# Patient Record
Sex: FEMALE | Race: WHITE | NOT HISPANIC OR LATINO
[De-identification: names, ages, dates, MRNs, and addresses within clinical notes are randomized per-mention and may not be internally consistent; named-entity substitution may affect disease eponyms.]

---

## 2017-01-10 ENCOUNTER — APPOINTMENT (OUTPATIENT)
Dept: FAMILY MEDICINE | Facility: CLINIC | Age: 42
End: 2017-01-10

## 2017-01-10 VITALS
HEART RATE: 80 BPM | SYSTOLIC BLOOD PRESSURE: 124 MMHG | BODY MASS INDEX: 38.99 KG/M2 | DIASTOLIC BLOOD PRESSURE: 70 MMHG | HEIGHT: 65 IN | WEIGHT: 234 LBS | TEMPERATURE: 97.9 F | RESPIRATION RATE: 14 BRPM

## 2017-02-04 ENCOUNTER — LABORATORY RESULT (OUTPATIENT)
Age: 42
End: 2017-02-04

## 2017-02-15 ENCOUNTER — OTHER (OUTPATIENT)
Age: 42
End: 2017-02-15

## 2017-05-16 ENCOUNTER — APPOINTMENT (OUTPATIENT)
Dept: FAMILY MEDICINE | Facility: CLINIC | Age: 42
End: 2017-05-16

## 2017-05-25 ENCOUNTER — APPOINTMENT (OUTPATIENT)
Dept: FAMILY MEDICINE | Facility: CLINIC | Age: 42
End: 2017-05-25

## 2017-05-25 VITALS
OXYGEN SATURATION: 98 % | HEIGHT: 65 IN | WEIGHT: 234 LBS | TEMPERATURE: 98.4 F | BODY MASS INDEX: 38.99 KG/M2 | RESPIRATION RATE: 15 BRPM | SYSTOLIC BLOOD PRESSURE: 120 MMHG | HEART RATE: 78 BPM | DIASTOLIC BLOOD PRESSURE: 78 MMHG

## 2017-05-25 RX ORDER — FLUTICASONE PROPIONATE 50 UG/1
50 SPRAY, METERED NASAL DAILY
Qty: 1 | Refills: 1 | Status: DISCONTINUED | COMMUNITY
Start: 2017-01-10 | End: 2017-05-25

## 2017-05-25 RX ORDER — AZITHROMYCIN 250 MG/1
250 TABLET, FILM COATED ORAL
Qty: 1 | Refills: 0 | Status: DISCONTINUED | COMMUNITY
Start: 2017-01-10 | End: 2017-05-25

## 2017-07-20 ENCOUNTER — LABORATORY RESULT (OUTPATIENT)
Age: 42
End: 2017-07-20

## 2017-07-20 ENCOUNTER — APPOINTMENT (OUTPATIENT)
Dept: ENDOCRINOLOGY | Facility: CLINIC | Age: 42
End: 2017-07-20

## 2017-07-20 VITALS
SYSTOLIC BLOOD PRESSURE: 120 MMHG | BODY MASS INDEX: 38.49 KG/M2 | DIASTOLIC BLOOD PRESSURE: 66 MMHG | HEIGHT: 65 IN | WEIGHT: 231 LBS | OXYGEN SATURATION: 99 % | HEART RATE: 89 BPM

## 2017-07-26 LAB
T3RU NFR SERPL: 0.99 INDEX
T4 SERPL-MCNC: 9.3 UG/DL
THYROGLOB AB SERPL-ACNC: <20 IU/ML
THYROGLOB SERPL-MCNC: <0.2 NG/ML
TSH SERPL-ACNC: 0.61 UIU/ML

## 2017-08-23 ENCOUNTER — OUTPATIENT (OUTPATIENT)
Dept: OUTPATIENT SERVICES | Facility: HOSPITAL | Age: 42
LOS: 1 days | End: 2017-08-23
Payer: COMMERCIAL

## 2017-08-23 ENCOUNTER — APPOINTMENT (OUTPATIENT)
Dept: MAMMOGRAPHY | Facility: IMAGING CENTER | Age: 42
End: 2017-08-23
Payer: COMMERCIAL

## 2017-08-23 DIAGNOSIS — Z12.31 ENCOUNTER FOR SCREENING MAMMOGRAM FOR MALIGNANT NEOPLASM OF BREAST: ICD-10-CM

## 2017-08-23 PROCEDURE — 77067 SCR MAMMO BI INCL CAD: CPT

## 2017-08-23 PROCEDURE — G0202: CPT | Mod: 26

## 2017-08-23 PROCEDURE — 77063 BREAST TOMOSYNTHESIS BI: CPT | Mod: 26

## 2017-08-23 PROCEDURE — 77063 BREAST TOMOSYNTHESIS BI: CPT

## 2017-10-10 ENCOUNTER — APPOINTMENT (OUTPATIENT)
Dept: FAMILY MEDICINE | Facility: CLINIC | Age: 42
End: 2017-10-10
Payer: COMMERCIAL

## 2017-10-10 VITALS
HEIGHT: 64 IN | HEART RATE: 103 BPM | SYSTOLIC BLOOD PRESSURE: 111 MMHG | WEIGHT: 225 LBS | BODY MASS INDEX: 38.41 KG/M2 | DIASTOLIC BLOOD PRESSURE: 80 MMHG

## 2017-10-10 DIAGNOSIS — E22.1 HYPERPROLACTINEMIA: ICD-10-CM

## 2017-10-10 PROCEDURE — 99214 OFFICE O/P EST MOD 30 MIN: CPT

## 2017-11-01 ENCOUNTER — RX RENEWAL (OUTPATIENT)
Age: 42
End: 2017-11-01

## 2017-12-27 ENCOUNTER — APPOINTMENT (OUTPATIENT)
Dept: CARDIOLOGY | Facility: CLINIC | Age: 42
End: 2017-12-27

## 2018-01-30 ENCOUNTER — APPOINTMENT (OUTPATIENT)
Dept: OBGYN | Facility: CLINIC | Age: 43
End: 2018-01-30
Payer: COMMERCIAL

## 2018-01-30 VITALS
DIASTOLIC BLOOD PRESSURE: 87 MMHG | BODY MASS INDEX: 38.65 KG/M2 | HEIGHT: 64 IN | SYSTOLIC BLOOD PRESSURE: 135 MMHG | HEART RATE: 80 BPM | WEIGHT: 226.38 LBS

## 2018-01-30 PROCEDURE — 99396 PREV VISIT EST AGE 40-64: CPT

## 2018-01-30 RX ORDER — FLUTICASONE PROPIONATE 50 UG/1
50 SPRAY, METERED NASAL TWICE DAILY
Qty: 1 | Refills: 2 | Status: COMPLETED | COMMUNITY
Start: 2017-05-25 | End: 2018-01-30

## 2018-01-30 RX ORDER — AMOXICILLIN AND CLAVULANATE POTASSIUM 875; 125 MG/1; MG/1
875-125 TABLET, COATED ORAL TWICE DAILY
Qty: 20 | Refills: 0 | Status: COMPLETED | COMMUNITY
Start: 2017-05-25 | End: 2018-01-30

## 2018-01-30 RX ORDER — FLUCONAZOLE 150 MG/1
150 TABLET ORAL DAILY
Qty: 1 | Refills: 0 | Status: COMPLETED | COMMUNITY
Start: 2017-05-25 | End: 2018-01-30

## 2018-01-31 LAB — HPV HIGH+LOW RISK DNA PNL CVX: NOT DETECTED

## 2018-02-05 LAB — CYTOLOGY CVX/VAG DOC THIN PREP: NORMAL

## 2018-02-13 ENCOUNTER — TRANSCRIPTION ENCOUNTER (OUTPATIENT)
Age: 43
End: 2018-02-13

## 2018-02-14 ENCOUNTER — APPOINTMENT (OUTPATIENT)
Dept: FAMILY MEDICINE | Facility: CLINIC | Age: 43
End: 2018-02-14

## 2018-04-03 ENCOUNTER — APPOINTMENT (OUTPATIENT)
Dept: CARDIOLOGY | Facility: CLINIC | Age: 43
End: 2018-04-03
Payer: COMMERCIAL

## 2018-04-03 ENCOUNTER — NON-APPOINTMENT (OUTPATIENT)
Age: 43
End: 2018-04-03

## 2018-04-03 VITALS — HEART RATE: 80 BPM | DIASTOLIC BLOOD PRESSURE: 90 MMHG | SYSTOLIC BLOOD PRESSURE: 130 MMHG

## 2018-04-03 VITALS
HEIGHT: 64 IN | SYSTOLIC BLOOD PRESSURE: 123 MMHG | BODY MASS INDEX: 38.58 KG/M2 | HEART RATE: 60 BPM | DIASTOLIC BLOOD PRESSURE: 82 MMHG | WEIGHT: 226 LBS | RESPIRATION RATE: 17 BRPM | OXYGEN SATURATION: 100 %

## 2018-04-03 PROCEDURE — 99214 OFFICE O/P EST MOD 30 MIN: CPT

## 2018-04-03 PROCEDURE — 93000 ELECTROCARDIOGRAM COMPLETE: CPT

## 2018-04-03 PROCEDURE — 93228 REMOTE 30 DAY ECG REV/REPORT: CPT

## 2018-04-04 ENCOUNTER — APPOINTMENT (OUTPATIENT)
Dept: CARDIOLOGY | Facility: CLINIC | Age: 43
End: 2018-04-04
Payer: COMMERCIAL

## 2018-04-04 PROCEDURE — 93306 TTE W/DOPPLER COMPLETE: CPT

## 2018-04-25 ENCOUNTER — APPOINTMENT (OUTPATIENT)
Dept: CARDIOLOGY | Facility: CLINIC | Age: 43
End: 2018-04-25

## 2018-05-10 ENCOUNTER — CLINICAL ADVICE (OUTPATIENT)
Age: 43
End: 2018-05-10

## 2018-05-17 ENCOUNTER — APPOINTMENT (OUTPATIENT)
Dept: CARDIOLOGY | Facility: CLINIC | Age: 43
End: 2018-05-17
Payer: COMMERCIAL

## 2018-05-17 ENCOUNTER — NON-APPOINTMENT (OUTPATIENT)
Age: 43
End: 2018-05-17

## 2018-05-17 VITALS
SYSTOLIC BLOOD PRESSURE: 140 MMHG | OXYGEN SATURATION: 100 % | DIASTOLIC BLOOD PRESSURE: 88 MMHG | WEIGHT: 225 LBS | HEIGHT: 64 IN | BODY MASS INDEX: 38.41 KG/M2 | HEART RATE: 93 BPM

## 2018-05-17 PROCEDURE — 99214 OFFICE O/P EST MOD 30 MIN: CPT

## 2018-05-17 PROCEDURE — 93000 ELECTROCARDIOGRAM COMPLETE: CPT

## 2018-05-24 ENCOUNTER — APPOINTMENT (OUTPATIENT)
Dept: CARDIOLOGY | Facility: CLINIC | Age: 43
End: 2018-05-24
Payer: COMMERCIAL

## 2018-05-24 PROCEDURE — 93015 CV STRESS TEST SUPVJ I&R: CPT

## 2018-07-23 ENCOUNTER — APPOINTMENT (OUTPATIENT)
Dept: ENDOCRINOLOGY | Facility: CLINIC | Age: 43
End: 2018-07-23
Payer: COMMERCIAL

## 2018-07-23 ENCOUNTER — LABORATORY RESULT (OUTPATIENT)
Age: 43
End: 2018-07-23

## 2018-07-23 VITALS
HEART RATE: 78 BPM | DIASTOLIC BLOOD PRESSURE: 70 MMHG | BODY MASS INDEX: 39.27 KG/M2 | HEIGHT: 64 IN | SYSTOLIC BLOOD PRESSURE: 110 MMHG | WEIGHT: 230 LBS | OXYGEN SATURATION: 98 %

## 2018-07-23 PROCEDURE — 99214 OFFICE O/P EST MOD 30 MIN: CPT

## 2018-07-26 LAB
25(OH)D3 SERPL-MCNC: 28 NG/ML
T3RU NFR SERPL: 0.99 INDEX
T4 SERPL-MCNC: 8.5 UG/DL
THYROGLOB AB SERPL-ACNC: <20 IU/ML
THYROGLOB SERPL-MCNC: <0.2 NG/ML
TSH SERPL-ACNC: 0.5 UIU/ML

## 2018-08-25 ENCOUNTER — OUTPATIENT (OUTPATIENT)
Dept: OUTPATIENT SERVICES | Facility: HOSPITAL | Age: 43
LOS: 1 days | End: 2018-08-25
Payer: COMMERCIAL

## 2018-08-25 ENCOUNTER — APPOINTMENT (OUTPATIENT)
Dept: MAMMOGRAPHY | Facility: IMAGING CENTER | Age: 43
End: 2018-08-25
Payer: COMMERCIAL

## 2018-08-25 DIAGNOSIS — Z01.419 ENCOUNTER FOR GYNECOLOGICAL EXAMINATION (GENERAL) (ROUTINE) WITHOUT ABNORMAL FINDINGS: ICD-10-CM

## 2018-08-25 PROCEDURE — 77063 BREAST TOMOSYNTHESIS BI: CPT

## 2018-08-25 PROCEDURE — 77067 SCR MAMMO BI INCL CAD: CPT | Mod: 26

## 2018-08-25 PROCEDURE — 77067 SCR MAMMO BI INCL CAD: CPT

## 2018-08-25 PROCEDURE — 77063 BREAST TOMOSYNTHESIS BI: CPT | Mod: 26

## 2018-08-31 ENCOUNTER — EMERGENCY (EMERGENCY)
Facility: HOSPITAL | Age: 43
LOS: 1 days | Discharge: HOME | End: 2018-08-31
Attending: EMERGENCY MEDICINE | Admitting: EMERGENCY MEDICINE
Payer: COMMERCIAL

## 2018-08-31 VITALS
SYSTOLIC BLOOD PRESSURE: 132 MMHG | OXYGEN SATURATION: 98 % | WEIGHT: 220.02 LBS | RESPIRATION RATE: 14 BRPM | DIASTOLIC BLOOD PRESSURE: 80 MMHG | HEIGHT: 65 IN | TEMPERATURE: 99 F | HEART RATE: 80 BPM

## 2018-08-31 DIAGNOSIS — S09.90XA UNSPECIFIED INJURY OF HEAD, INITIAL ENCOUNTER: ICD-10-CM

## 2018-08-31 PROCEDURE — 81025 URINE PREGNANCY TEST: CPT

## 2018-08-31 PROCEDURE — 72220 X-RAY EXAM SACRUM TAILBONE: CPT | Mod: 26

## 2018-08-31 PROCEDURE — 99284 EMERGENCY DEPT VISIT MOD MDM: CPT

## 2018-08-31 PROCEDURE — 70450 CT HEAD/BRAIN W/O DYE: CPT | Mod: 26

## 2018-08-31 PROCEDURE — 72220 X-RAY EXAM SACRUM TAILBONE: CPT

## 2018-08-31 PROCEDURE — 99284 EMERGENCY DEPT VISIT MOD MDM: CPT | Mod: 25

## 2018-08-31 PROCEDURE — 70450 CT HEAD/BRAIN W/O DYE: CPT

## 2018-08-31 RX ORDER — ACETAMINOPHEN 500 MG
650 TABLET ORAL ONCE
Qty: 0 | Refills: 0 | Status: COMPLETED | OUTPATIENT
Start: 2018-08-31 | End: 2018-08-31

## 2018-08-31 RX ADMIN — Medication 650 MILLIGRAM(S): at 17:12

## 2018-08-31 NOTE — ED PROVIDER NOTE - ATTENDING CONTRIBUTION TO CARE
pt with mechanical fall at home striking lower back and head.  No LOC.  Pain in lower back and head    Gen:  Well appearning in NAD  Head:  NC/AT  Resp: No distress   Ext: no deformities  Skin: warm and dry as visualized     Pt with mechanical fall - will image both
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
## 2018-08-31 NOTE — ED ADULT NURSE NOTE - PMH
Allergic Asthma  Last attack was more than 5 years ago  GERD (Gastroesophageal Reflux Disease)  10 years ago  Heart Murmur    Malignant Neoplasm of Thyroid Gland    Seasonal Allergies    Thalassemia Trait    Thyroid Nodule

## 2018-08-31 NOTE — ED ADULT NURSE NOTE - NSIMPLEMENTINTERV_GEN_ALL_ED
Implemented All Fall Risk Interventions:  [place] to call system. Call bell, personal items and telephone within reach. Instruct patient to call for assistance. Room bathroom lighting operational. Non-slip footwear when patient is off stretcher. Physically safe environment: no spills, clutter or unnecessary equipment. Stretcher in lowest position, wheels locked, appropriate side rails in place. Provide visual cue, wrist band, yellow gown, etc. Monitor gait and stability. Monitor for mental status changes and reorient to person, place, and time. Review medications for side effects contributing to fall risk. Reinforce activity limits and safety measures with patient and family.

## 2018-08-31 NOTE — ED PROVIDER NOTE - CARE PLAN
Principal Discharge DX:	Injury of head, initial encounter Principal Discharge DX:	Injury of head, initial encounter  Secondary Diagnosis:	Acute low back pain without sciatica, unspecified back pain laterality

## 2018-09-06 ENCOUNTER — NON-APPOINTMENT (OUTPATIENT)
Age: 43
End: 2018-09-06

## 2018-09-06 ENCOUNTER — APPOINTMENT (OUTPATIENT)
Dept: CARDIOLOGY | Facility: CLINIC | Age: 43
End: 2018-09-06
Payer: COMMERCIAL

## 2018-09-06 VITALS
DIASTOLIC BLOOD PRESSURE: 86 MMHG | HEART RATE: 77 BPM | SYSTOLIC BLOOD PRESSURE: 128 MMHG | WEIGHT: 230 LBS | BODY MASS INDEX: 39.27 KG/M2 | OXYGEN SATURATION: 99 % | HEIGHT: 64 IN

## 2018-09-06 DIAGNOSIS — Z87.898 PERSONAL HISTORY OF OTHER SPECIFIED CONDITIONS: ICD-10-CM

## 2018-09-06 DIAGNOSIS — M54.9 DORSALGIA, UNSPECIFIED: ICD-10-CM

## 2018-09-06 DIAGNOSIS — Z86.19 PERSONAL HISTORY OF OTHER INFECTIOUS AND PARASITIC DISEASES: ICD-10-CM

## 2018-09-06 DIAGNOSIS — Z01.419 ENCOUNTER FOR GYNECOLOGICAL EXAMINATION (GENERAL) (ROUTINE) W/OUT ABNORMAL FINDINGS: ICD-10-CM

## 2018-09-06 DIAGNOSIS — Z87.09 PERSONAL HISTORY OF OTHER DISEASES OF THE RESPIRATORY SYSTEM: ICD-10-CM

## 2018-09-06 DIAGNOSIS — J06.9 ACUTE UPPER RESPIRATORY INFECTION, UNSPECIFIED: ICD-10-CM

## 2018-09-06 DIAGNOSIS — L72.12 TRICHODERMAL CYST: ICD-10-CM

## 2018-09-06 PROCEDURE — 99214 OFFICE O/P EST MOD 30 MIN: CPT

## 2018-09-06 PROCEDURE — 93000 ELECTROCARDIOGRAM COMPLETE: CPT

## 2018-11-02 ENCOUNTER — RX RENEWAL (OUTPATIENT)
Age: 43
End: 2018-11-02

## 2019-02-05 ENCOUNTER — APPOINTMENT (OUTPATIENT)
Dept: OBGYN | Facility: CLINIC | Age: 44
End: 2019-02-05
Payer: COMMERCIAL

## 2019-02-05 VITALS
HEIGHT: 64 IN | WEIGHT: 228 LBS | BODY MASS INDEX: 38.93 KG/M2 | DIASTOLIC BLOOD PRESSURE: 85 MMHG | HEART RATE: 65 BPM | SYSTOLIC BLOOD PRESSURE: 127 MMHG

## 2019-02-05 PROCEDURE — 99396 PREV VISIT EST AGE 40-64: CPT

## 2019-02-05 NOTE — PHYSICAL EXAM
[Awake] : awake [Alert] : alert [Acute Distress] : no acute distress [Mass] : no breast mass [Nipple Discharge] : no nipple discharge [Axillary LAD] : no axillary lymphadenopathy [Soft] : soft [Tender] : non tender [Oriented x3] : oriented to person, place, and time [Normal] : uterus [No Bleeding] : there was no active vaginal bleeding [Anteversion] : anteverted [Uterine Adnexae] : were not tender and not enlarged

## 2019-02-05 NOTE — CHIEF COMPLAINT
[Annual Visit] : annual visit [FreeTextEntry1] : 43 y.o. P 2003   LMP  1/27/19 for annual exam. pt is s/p Total thyroidectomy for thyroid ca. pt is on Synthroid, Gertrudis Vu is Endocrinologist. . pt is due for mammo in Aug. and pap in 2021  had vasectomy. children are Santosh and Laura.

## 2019-04-29 ENCOUNTER — RX RENEWAL (OUTPATIENT)
Age: 44
End: 2019-04-29

## 2019-07-29 ENCOUNTER — TRANSCRIPTION ENCOUNTER (OUTPATIENT)
Age: 44
End: 2019-07-29

## 2019-07-29 ENCOUNTER — LABORATORY RESULT (OUTPATIENT)
Age: 44
End: 2019-07-29

## 2019-07-29 ENCOUNTER — APPOINTMENT (OUTPATIENT)
Dept: ENDOCRINOLOGY | Facility: CLINIC | Age: 44
End: 2019-07-29
Payer: COMMERCIAL

## 2019-07-29 VITALS
BODY MASS INDEX: 38.76 KG/M2 | HEART RATE: 83 BPM | WEIGHT: 227 LBS | DIASTOLIC BLOOD PRESSURE: 80 MMHG | HEIGHT: 64 IN | OXYGEN SATURATION: 98 % | SYSTOLIC BLOOD PRESSURE: 120 MMHG

## 2019-07-29 PROCEDURE — 99214 OFFICE O/P EST MOD 30 MIN: CPT

## 2019-07-30 ENCOUNTER — APPOINTMENT (OUTPATIENT)
Dept: CARDIOLOGY | Facility: CLINIC | Age: 44
End: 2019-07-30
Payer: COMMERCIAL

## 2019-07-30 ENCOUNTER — NON-APPOINTMENT (OUTPATIENT)
Age: 44
End: 2019-07-30

## 2019-07-30 ENCOUNTER — CLINICAL ADVICE (OUTPATIENT)
Age: 44
End: 2019-07-30

## 2019-07-30 VITALS
DIASTOLIC BLOOD PRESSURE: 84 MMHG | WEIGHT: 230 LBS | BODY MASS INDEX: 39.27 KG/M2 | OXYGEN SATURATION: 97 % | SYSTOLIC BLOOD PRESSURE: 139 MMHG | HEIGHT: 64 IN | HEART RATE: 88 BPM

## 2019-07-30 LAB
25(OH)D3 SERPL-MCNC: 29.9 NG/ML
ESTIMATED AVERAGE GLUCOSE: 114 MG/DL
HBA1C MFR BLD HPLC: 5.6 %
T3RU NFR SERPL: 1 TBI
T4 SERPL-MCNC: 7.5 UG/DL
THYROGLOB AB SERPL-ACNC: <20 IU/ML
THYROGLOB SERPL-MCNC: <0.2 NG/ML
TSH SERPL-ACNC: 1.32 UIU/ML

## 2019-07-30 PROCEDURE — 93000 ELECTROCARDIOGRAM COMPLETE: CPT

## 2019-07-30 PROCEDURE — 99214 OFFICE O/P EST MOD 30 MIN: CPT

## 2019-07-30 NOTE — REVIEW OF SYSTEMS
[Headache] : headache [Chest Pain] : chest pain [Palpitations] : palpitations [see HPI] : see HPI [Numbness (Hypesthesia)] : numbness [Tingling (Paresthesia)] : tingling [Negative] : Heme/Lymph [Shortness Of Breath] : no shortness of breath

## 2019-07-30 NOTE — DISCUSSION/SUMMARY
[FreeTextEntry1] : The patient was examined. Her blood pressure was 139/84,and  her pulse was 88. Her lungs were clear to auscultation. Cardiac exam was  negative for murmurs rubs or gallops. Her EKG showed normal sinus rhythm and normal QRS complexes.No new medications were prescribed at this visit. She will return in approximately 12 months ,or earlier if needed. She'll continue her current medication. Will send her for an echocardiogram to assess her valvular heart disease. We'll set of routine blood tests, except the blood test with the endocrinologist sent..

## 2019-07-30 NOTE — PHYSICAL EXAM
[General Appearance - Well Developed] : well developed [Normal Appearance] : normal appearance [Well Groomed] : well groomed [No Deformities] : no deformities [General Appearance - In No Acute Distress] : no acute distress [Normal Conjunctiva] : the conjunctiva exhibited no abnormalities [No Oral Pallor] : no oral pallor [Normal Oral Mucosa] : normal oral mucosa [Eyelids - No Xanthelasma] : the eyelids demonstrated no xanthelasmas [No Oral Cyanosis] : no oral cyanosis [Normal Jugular Venous A Waves Present] : normal jugular venous A waves present [Normal Jugular Venous V Waves Present] : normal jugular venous V waves present [No Jugular Venous Castillo A Waves] : no jugular venous castillo A waves [Exaggerated Use Of Accessory Muscles For Inspiration] : no accessory muscle use [Auscultation Breath Sounds / Voice Sounds] : lungs were clear to auscultation bilaterally [Respiration, Rhythm And Depth] : normal respiratory rhythm and effort [Heart Rate And Rhythm] : heart rate and rhythm were normal [Heart Sounds] : normal S1 and S2 [Murmurs] : no murmurs present [Abdomen Soft] : soft [Abdomen Mass (___ Cm)] : no abdominal mass palpated [Abdomen Tenderness] : non-tender [Nail Clubbing] : no clubbing of the fingernails [Gait - Sufficient For Exercise Testing] : the gait was sufficient for exercise testing [Abnormal Walk] : normal gait [Cyanosis, Localized] : no localized cyanosis [Petechial Hemorrhages (___cm)] : no petechial hemorrhages [Skin Color & Pigmentation] : normal skin color and pigmentation [] : no rash [Skin Lesions] : no skin lesions [No Venous Stasis] : no venous stasis [No Skin Ulcers] : no skin ulcer [No Xanthoma] : no  xanthoma was observed [Oriented To Time, Place, And Person] : oriented to person, place, and time [Mood] : the mood was normal [Affect] : the affect was normal [No Anxiety] : not feeling anxious [FreeTextEntry1] : obese
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
## 2019-07-31 LAB
ALBUMIN SERPL ELPH-MCNC: 4.9 G/DL
ALP BLD-CCNC: 62 U/L
ALT SERPL-CCNC: 23 U/L
ANION GAP SERPL CALC-SCNC: 13 MMOL/L
AST SERPL-CCNC: 19 U/L
BASOPHILS # BLD AUTO: 0.11 K/UL
BASOPHILS NFR BLD AUTO: 1.1 %
BILIRUB SERPL-MCNC: 0.8 MG/DL
BUN SERPL-MCNC: 15 MG/DL
CALCIUM SERPL-MCNC: 9.6 MG/DL
CHLORIDE SERPL-SCNC: 106 MMOL/L
CHOLEST SERPL-MCNC: 167 MG/DL
CHOLEST/HDLC SERPL: 5.1 RATIO
CO2 SERPL-SCNC: 21 MMOL/L
CREAT SERPL-MCNC: 0.55 MG/DL
EOSINOPHIL # BLD AUTO: 0.31 K/UL
EOSINOPHIL NFR BLD AUTO: 3.1 %
GLUCOSE SERPL-MCNC: 84 MG/DL
HCT VFR BLD CALC: 40 %
HDLC SERPL-MCNC: 33 MG/DL
HGB BLD-MCNC: 11.3 G/DL
IMM GRANULOCYTES NFR BLD AUTO: 0.2 %
LDLC SERPL CALC-MCNC: 93 MG/DL
LYMPHOCYTES # BLD AUTO: 3.45 K/UL
LYMPHOCYTES NFR BLD AUTO: 34 %
MAN DIFF?: NORMAL
MCHC RBC-ENTMCNC: 19.9 PG
MCHC RBC-ENTMCNC: 28.3 GM/DL
MCV RBC AUTO: 70.5 FL
MONOCYTES # BLD AUTO: 0.67 K/UL
MONOCYTES NFR BLD AUTO: 6.6 %
NEUTROPHILS # BLD AUTO: 5.6 K/UL
NEUTROPHILS NFR BLD AUTO: 55 %
PLATELET # BLD AUTO: 363 K/UL
POTASSIUM SERPL-SCNC: 4.3 MMOL/L
PROT SERPL-MCNC: 7.5 G/DL
RBC # BLD: 5.67 M/UL
RBC # FLD: 18.9 %
SODIUM SERPL-SCNC: 140 MMOL/L
TRIGL SERPL-MCNC: 207 MG/DL
WBC # FLD AUTO: 10.16 K/UL

## 2019-08-20 ENCOUNTER — APPOINTMENT (OUTPATIENT)
Dept: CARDIOLOGY | Facility: CLINIC | Age: 44
End: 2019-08-20
Payer: COMMERCIAL

## 2019-08-20 PROCEDURE — 93306 TTE W/DOPPLER COMPLETE: CPT

## 2019-09-30 ENCOUNTER — FORM ENCOUNTER (OUTPATIENT)
Age: 44
End: 2019-09-30

## 2019-10-01 ENCOUNTER — APPOINTMENT (OUTPATIENT)
Dept: ULTRASOUND IMAGING | Facility: IMAGING CENTER | Age: 44
End: 2019-10-01
Payer: COMMERCIAL

## 2019-10-01 ENCOUNTER — APPOINTMENT (OUTPATIENT)
Dept: MAMMOGRAPHY | Facility: IMAGING CENTER | Age: 44
End: 2019-10-01
Payer: COMMERCIAL

## 2019-10-01 ENCOUNTER — OUTPATIENT (OUTPATIENT)
Dept: OUTPATIENT SERVICES | Facility: HOSPITAL | Age: 44
LOS: 1 days | End: 2019-10-01
Payer: COMMERCIAL

## 2019-10-01 DIAGNOSIS — Z01.419 ENCOUNTER FOR GYNECOLOGICAL EXAMINATION (GENERAL) (ROUTINE) WITHOUT ABNORMAL FINDINGS: ICD-10-CM

## 2019-10-01 PROCEDURE — 77067 SCR MAMMO BI INCL CAD: CPT

## 2019-10-01 PROCEDURE — 77063 BREAST TOMOSYNTHESIS BI: CPT | Mod: 26

## 2019-10-01 PROCEDURE — 77067 SCR MAMMO BI INCL CAD: CPT | Mod: 26

## 2019-10-01 PROCEDURE — 77063 BREAST TOMOSYNTHESIS BI: CPT

## 2019-10-01 PROCEDURE — 76641 ULTRASOUND BREAST COMPLETE: CPT | Mod: 26,50

## 2019-10-01 PROCEDURE — 76641 ULTRASOUND BREAST COMPLETE: CPT

## 2019-11-18 ENCOUNTER — APPOINTMENT (OUTPATIENT)
Dept: CARDIOLOGY | Facility: CLINIC | Age: 44
End: 2019-11-18

## 2019-11-27 ENCOUNTER — APPOINTMENT (OUTPATIENT)
Dept: CARDIOLOGY | Facility: CLINIC | Age: 44
End: 2019-11-27
Payer: COMMERCIAL

## 2019-11-27 ENCOUNTER — NON-APPOINTMENT (OUTPATIENT)
Age: 44
End: 2019-11-27

## 2019-11-27 VITALS
TEMPERATURE: 97.9 F | HEART RATE: 81 BPM | DIASTOLIC BLOOD PRESSURE: 87 MMHG | HEIGHT: 64 IN | RESPIRATION RATE: 17 BRPM | WEIGHT: 227 LBS | SYSTOLIC BLOOD PRESSURE: 133 MMHG | BODY MASS INDEX: 38.76 KG/M2 | OXYGEN SATURATION: 100 %

## 2019-11-27 DIAGNOSIS — R70.0 ELEVATED ERYTHROCYTE SEDIMENTATION RATE: ICD-10-CM

## 2019-11-27 DIAGNOSIS — H65.90 UNSPECIFIED NONSUPPURATIVE OTITIS MEDIA, UNSPECIFIED EAR: ICD-10-CM

## 2019-11-27 DIAGNOSIS — Z01.419 ENCOUNTER FOR GYNECOLOGICAL EXAMINATION (GENERAL) (ROUTINE) W/OUT ABNORMAL FINDINGS: ICD-10-CM

## 2019-11-27 DIAGNOSIS — R74.8 ABNORMAL LEVELS OF OTHER SERUM ENZYMES: ICD-10-CM

## 2019-11-27 DIAGNOSIS — R20.0 ANESTHESIA OF SKIN: ICD-10-CM

## 2019-11-27 DIAGNOSIS — E73.9 LACTOSE INTOLERANCE, UNSPECIFIED: ICD-10-CM

## 2019-11-27 DIAGNOSIS — R20.2 ANESTHESIA OF SKIN: ICD-10-CM

## 2019-11-27 DIAGNOSIS — R41.840 ATTENTION AND CONCENTRATION DEFICIT: ICD-10-CM

## 2019-11-27 PROCEDURE — 99214 OFFICE O/P EST MOD 30 MIN: CPT

## 2019-11-27 PROCEDURE — 93000 ELECTROCARDIOGRAM COMPLETE: CPT
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
## 2019-11-27 NOTE — DISCUSSION/SUMMARY
[FreeTextEntry1] : The patient was examined. Her blood pressure was 133/87,and  her pulse was 81. Her lungs were clear to auscultation. Cardiac exam was  negative for murmurs rubs or gallops. Her EKG showed normal sinus rhythm and nonspecific ST and T-wave changes. No acute changes are seen. .No new medications were prescribed at this visit. She will return in approximately 4  months ,or earlier if needed. She'll continue her current medication.

## 2019-11-27 NOTE — PHYSICAL EXAM
[General Appearance - Well Developed] : well developed [Normal Appearance] : normal appearance [Well Groomed] : well groomed [No Deformities] : no deformities [General Appearance - In No Acute Distress] : no acute distress [Normal Conjunctiva] : the conjunctiva exhibited no abnormalities [Eyelids - No Xanthelasma] : the eyelids demonstrated no xanthelasmas [Normal Oral Mucosa] : normal oral mucosa [No Oral Pallor] : no oral pallor [No Oral Cyanosis] : no oral cyanosis [Normal Jugular Venous A Waves Present] : normal jugular venous A waves present [Normal Jugular Venous V Waves Present] : normal jugular venous V waves present [No Jugular Venous Castillo A Waves] : no jugular venous castillo A waves [Respiration, Rhythm And Depth] : normal respiratory rhythm and effort [Exaggerated Use Of Accessory Muscles For Inspiration] : no accessory muscle use [Auscultation Breath Sounds / Voice Sounds] : lungs were clear to auscultation bilaterally [Heart Rate And Rhythm] : heart rate and rhythm were normal [Heart Sounds] : normal S1 and S2 [Murmurs] : no murmurs present [Abdomen Soft] : soft [Abdomen Tenderness] : non-tender [Abdomen Mass (___ Cm)] : no abdominal mass palpated [Abnormal Walk] : normal gait [Gait - Sufficient For Exercise Testing] : the gait was sufficient for exercise testing [Nail Clubbing] : no clubbing of the fingernails [Cyanosis, Localized] : no localized cyanosis [Petechial Hemorrhages (___cm)] : no petechial hemorrhages [Skin Color & Pigmentation] : normal skin color and pigmentation [] : no rash [No Venous Stasis] : no venous stasis [Skin Lesions] : no skin lesions [No Skin Ulcers] : no skin ulcer [No Xanthoma] : no  xanthoma was observed [Oriented To Time, Place, And Person] : oriented to person, place, and time [Affect] : the affect was normal [Mood] : the mood was normal [No Anxiety] : not feeling anxious [FreeTextEntry1] : obese

## 2020-01-22 ENCOUNTER — TRANSCRIPTION ENCOUNTER (OUTPATIENT)
Age: 45
End: 2020-01-22

## 2020-02-24 ENCOUNTER — APPOINTMENT (OUTPATIENT)
Dept: CARDIOLOGY | Facility: CLINIC | Age: 45
End: 2020-02-24
Payer: COMMERCIAL

## 2020-02-24 ENCOUNTER — NON-APPOINTMENT (OUTPATIENT)
Age: 45
End: 2020-02-24

## 2020-02-24 VITALS — HEIGHT: 64 IN | BODY MASS INDEX: 39.09 KG/M2 | WEIGHT: 229 LBS | HEART RATE: 82 BPM | OXYGEN SATURATION: 100 %

## 2020-02-24 VITALS — HEART RATE: 82 BPM | SYSTOLIC BLOOD PRESSURE: 133 MMHG | OXYGEN SATURATION: 100 % | DIASTOLIC BLOOD PRESSURE: 85 MMHG

## 2020-02-24 PROCEDURE — 93000 ELECTROCARDIOGRAM COMPLETE: CPT

## 2020-02-24 PROCEDURE — 99214 OFFICE O/P EST MOD 30 MIN: CPT

## 2020-02-24 NOTE — DISCUSSION/SUMMARY
[FreeTextEntry1] : The patient was examined. Her blood pressure was 133/85,and  her pulse was 82. Her lungs were clear to auscultation. Cardiac exam was  negative for murmurs rubs or gallops. Her EKG showed normal sinus rhythm and no acute changes are seen. .No new medications were prescribed at this visit. She will return in approximately 4  months ,or earlier if needed. She'll continue her current medication.
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
## 2020-02-24 NOTE — PHYSICAL EXAM
[General Appearance - Well Developed] : well developed [Normal Appearance] : normal appearance [Well Groomed] : well groomed [No Deformities] : no deformities [General Appearance - In No Acute Distress] : no acute distress [Normal Conjunctiva] : the conjunctiva exhibited no abnormalities [Eyelids - No Xanthelasma] : the eyelids demonstrated no xanthelasmas [No Oral Pallor] : no oral pallor [Normal Oral Mucosa] : normal oral mucosa [Normal Jugular Venous A Waves Present] : normal jugular venous A waves present [No Oral Cyanosis] : no oral cyanosis [Normal Jugular Venous V Waves Present] : normal jugular venous V waves present [No Jugular Venous Castillo A Waves] : no jugular venous castillo A waves [Exaggerated Use Of Accessory Muscles For Inspiration] : no accessory muscle use [Respiration, Rhythm And Depth] : normal respiratory rhythm and effort [Auscultation Breath Sounds / Voice Sounds] : lungs were clear to auscultation bilaterally [Heart Rate And Rhythm] : heart rate and rhythm were normal [Heart Sounds] : normal S1 and S2 [Abdomen Soft] : soft [Murmurs] : no murmurs present [Abdomen Tenderness] : non-tender [Abnormal Walk] : normal gait [Gait - Sufficient For Exercise Testing] : the gait was sufficient for exercise testing [Abdomen Mass (___ Cm)] : no abdominal mass palpated [Cyanosis, Localized] : no localized cyanosis [Nail Clubbing] : no clubbing of the fingernails [Petechial Hemorrhages (___cm)] : no petechial hemorrhages [Skin Color & Pigmentation] : normal skin color and pigmentation [No Venous Stasis] : no venous stasis [] : no rash [Skin Lesions] : no skin lesions [No Skin Ulcers] : no skin ulcer [No Xanthoma] : no  xanthoma was observed [Mood] : the mood was normal [Oriented To Time, Place, And Person] : oriented to person, place, and time [Affect] : the affect was normal [No Anxiety] : not feeling anxious [FreeTextEntry1] : obese

## 2020-02-25 LAB
25(OH)D3 SERPL-MCNC: 24.8 NG/ML
ALBUMIN SERPL ELPH-MCNC: 4.9 G/DL
ALP BLD-CCNC: 65 U/L
ALT SERPL-CCNC: 24 U/L
ANION GAP SERPL CALC-SCNC: 15 MMOL/L
AST SERPL-CCNC: 18 U/L
BASOPHILS # BLD AUTO: 0.11 K/UL
BASOPHILS NFR BLD AUTO: 1.1 %
BILIRUB SERPL-MCNC: 1 MG/DL
BUN SERPL-MCNC: 11 MG/DL
CALCIUM SERPL-MCNC: 9.5 MG/DL
CHLORIDE SERPL-SCNC: 104 MMOL/L
CHOLEST SERPL-MCNC: 178 MG/DL
CHOLEST/HDLC SERPL: 4.9 RATIO
CO2 SERPL-SCNC: 21 MMOL/L
CREAT SERPL-MCNC: 0.65 MG/DL
EOSINOPHIL # BLD AUTO: 0.27 K/UL
EOSINOPHIL NFR BLD AUTO: 2.6 %
ESTIMATED AVERAGE GLUCOSE: 117 MG/DL
GLUCOSE SERPL-MCNC: 83 MG/DL
HBA1C MFR BLD HPLC: 5.7 %
HCT VFR BLD CALC: 37.4 %
HDLC SERPL-MCNC: 36 MG/DL
HGB BLD-MCNC: 11 G/DL
IMM GRANULOCYTES NFR BLD AUTO: 0.4 %
LDLC SERPL CALC-MCNC: 94 MG/DL
LYMPHOCYTES # BLD AUTO: 3.34 K/UL
LYMPHOCYTES NFR BLD AUTO: 32.6 %
MAN DIFF?: NORMAL
MCHC RBC-ENTMCNC: 19.6 PG
MCHC RBC-ENTMCNC: 29.4 GM/DL
MCV RBC AUTO: 66.7 FL
MONOCYTES # BLD AUTO: 0.7 K/UL
MONOCYTES NFR BLD AUTO: 6.8 %
NEUTROPHILS # BLD AUTO: 5.79 K/UL
NEUTROPHILS NFR BLD AUTO: 56.5 %
PLATELET # BLD AUTO: 367 K/UL
POTASSIUM SERPL-SCNC: 4.2 MMOL/L
PROT SERPL-MCNC: 7.1 G/DL
RBC # BLD: 5.61 M/UL
RBC # FLD: 16.3 %
SODIUM SERPL-SCNC: 140 MMOL/L
T4 SERPL-MCNC: 7.6 UG/DL
TRIGL SERPL-MCNC: 239 MG/DL
TSH SERPL-ACNC: 1.56 UIU/ML
WBC # FLD AUTO: 10.25 K/UL

## 2020-04-22 ENCOUNTER — RX RENEWAL (OUTPATIENT)
Age: 45
End: 2020-04-22

## 2020-05-20 NOTE — ED ADULT NURSE NOTE - EENT WDL
71 Eyes with no visual disturbances.  Ears clean and dry and no hearing difficulties. Nose with pink mucosa and no drainage.  Mouth mucous membranes moist and pink.  No tenderness or swelling to throat or neck.

## 2020-06-02 ENCOUNTER — APPOINTMENT (OUTPATIENT)
Dept: OBGYN | Facility: CLINIC | Age: 45
End: 2020-06-02
Payer: COMMERCIAL

## 2020-06-02 VITALS
WEIGHT: 231 LBS | HEIGHT: 64 IN | BODY MASS INDEX: 39.44 KG/M2 | SYSTOLIC BLOOD PRESSURE: 151 MMHG | DIASTOLIC BLOOD PRESSURE: 85 MMHG | HEART RATE: 68 BPM | TEMPERATURE: 97.9 F

## 2020-06-02 PROCEDURE — 99396 PREV VISIT EST AGE 40-64: CPT

## 2020-06-02 NOTE — PHYSICAL EXAM
[Awake] : awake [Alert] : alert [Soft] : soft [Oriented x3] : oriented to person, place, and time [Normal] : cervix [No Bleeding] : there was no active vaginal bleeding [Uterine Adnexae] : were not tender and not enlarged [Anteversion] : anteverted [Acute Distress] : no acute distress [Mass] : no breast mass [Nipple Discharge] : no nipple discharge [Axillary LAD] : no axillary lymphadenopathy [Tender] : non tender

## 2020-06-02 NOTE — CHIEF COMPLAINT
[Annual Visit] : annual visit [FreeTextEntry1] : 45 y.o. P 2003    LNMP 5/27/20, presents for annual exam. pt reports irregular cycle interval . pt is s/p total thyroidectomy and is on Synthroid 150mcg daily , followed annually by Gertrudis Vu Endocrinology. last mammo was 10/1/19, Birads-2. today pt reports dysuria and vaginal discharge . pt also reports seeing  'bumps' on the perineum between Feb. and April  , spontaneously resolved. pt is a teacher

## 2020-06-03 LAB
C TRACH RRNA SPEC QL NAA+PROBE: NOT DETECTED
CANDIDA VAG CYTO: NOT DETECTED
FSH SERPL-MCNC: 2.2 IU/L
G VAGINALIS+PREV SP MTYP VAG QL MICRO: NOT DETECTED
LH SERPL-ACNC: 2.6 IU/L
N GONORRHOEA RRNA SPEC QL NAA+PROBE: NOT DETECTED
SARS-COV-2 IGG SERPL IA-ACNC: 6 INDEX
SARS-COV-2 IGG SERPL QL IA: POSITIVE
SOURCE AMPLIFICATION: NORMAL
T VAGINALIS VAG QL WET PREP: NOT DETECTED
TSH SERPL-ACNC: 1.31 UIU/ML

## 2020-06-04 ENCOUNTER — NON-APPOINTMENT (OUTPATIENT)
Age: 45
End: 2020-06-04

## 2020-06-04 ENCOUNTER — APPOINTMENT (OUTPATIENT)
Dept: CARDIOLOGY | Facility: CLINIC | Age: 45
End: 2020-06-04
Payer: COMMERCIAL

## 2020-06-04 VITALS
SYSTOLIC BLOOD PRESSURE: 129 MMHG | HEART RATE: 92 BPM | BODY MASS INDEX: 39.27 KG/M2 | HEIGHT: 64 IN | DIASTOLIC BLOOD PRESSURE: 92 MMHG | OXYGEN SATURATION: 98 % | TEMPERATURE: 97.9 F | WEIGHT: 230 LBS

## 2020-06-04 PROCEDURE — 99214 OFFICE O/P EST MOD 30 MIN: CPT

## 2020-06-04 PROCEDURE — 93000 ELECTROCARDIOGRAM COMPLETE: CPT
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
## 2020-06-04 NOTE — REVIEW OF SYSTEMS
[Headache] : headache [Palpitations] : palpitations [see HPI] : see HPI [Numbness (Hypesthesia)] : numbness [Tingling (Paresthesia)] : tingling [Negative] : Heme/Lymph [Shortness Of Breath] : no shortness of breath [Chest Pain] : no chest pain

## 2020-06-04 NOTE — PHYSICAL EXAM
[General Appearance - Well Developed] : well developed [Normal Appearance] : normal appearance [General Appearance - In No Acute Distress] : no acute distress [No Deformities] : no deformities [Well Groomed] : well groomed [Normal Conjunctiva] : the conjunctiva exhibited no abnormalities [No Oral Pallor] : no oral pallor [Normal Oral Mucosa] : normal oral mucosa [Eyelids - No Xanthelasma] : the eyelids demonstrated no xanthelasmas [Normal Jugular Venous V Waves Present] : normal jugular venous V waves present [Normal Jugular Venous A Waves Present] : normal jugular venous A waves present [No Oral Cyanosis] : no oral cyanosis [No Jugular Venous Castillo A Waves] : no jugular venous castillo A waves [Exaggerated Use Of Accessory Muscles For Inspiration] : no accessory muscle use [Respiration, Rhythm And Depth] : normal respiratory rhythm and effort [Auscultation Breath Sounds / Voice Sounds] : lungs were clear to auscultation bilaterally [Heart Sounds] : normal S1 and S2 [Heart Rate And Rhythm] : heart rate and rhythm were normal [Abdomen Soft] : soft [Murmurs] : no murmurs present [Abdomen Tenderness] : non-tender [Abdomen Mass (___ Cm)] : no abdominal mass palpated [Nail Clubbing] : no clubbing of the fingernails [Abnormal Walk] : normal gait [Gait - Sufficient For Exercise Testing] : the gait was sufficient for exercise testing [Petechial Hemorrhages (___cm)] : no petechial hemorrhages [Cyanosis, Localized] : no localized cyanosis [Skin Color & Pigmentation] : normal skin color and pigmentation [No Venous Stasis] : no venous stasis [] : no rash [Skin Lesions] : no skin lesions [No Skin Ulcers] : no skin ulcer [No Xanthoma] : no  xanthoma was observed [Affect] : the affect was normal [Oriented To Time, Place, And Person] : oriented to person, place, and time [Mood] : the mood was normal [No Anxiety] : not feeling anxious [FreeTextEntry1] : obese

## 2020-06-04 NOTE — DISCUSSION/SUMMARY
[FreeTextEntry1] : The patient was examined. Her blood pressure was 129/92,and  her pulse was 92. Her lungs were clear to auscultation. Cardiac exam was  negative for murmurs rubs or gallops. Her EKG showed normal sinus rhythm and no acute changes are seen. .No new medications were prescribed at this visit. She will return in approximately 4  months ,or earlier if needed. She'll continue her current medication.  After the next visit, we will order an echocardiogram to check on her mitral regurgitation and aortic insufficiency.  She was told that if the tingling paresthesias get worse she has to follow-up again with her neurologist.

## 2020-06-05 LAB — BACTERIA UR CULT: NORMAL

## 2020-09-14 ENCOUNTER — APPOINTMENT (OUTPATIENT)
Dept: ENDOCRINOLOGY | Facility: CLINIC | Age: 45
End: 2020-09-14
Payer: COMMERCIAL

## 2020-09-14 PROCEDURE — 99214 OFFICE O/P EST MOD 30 MIN: CPT | Mod: 95

## 2020-09-15 NOTE — ASSESSMENT
[FreeTextEntry1] : 45 year old woman with papillary thyroid cancer post thyroidectomy in 2011 and radioiodine with excellent response to tx, on levothyroxine, clinically euthyroid\par   - Check TFTs and Tg, adjust levothyroxine dose as needed\par \par Overweight/prediabetes - Encouraged pt to continue with diet and increase exercise\par  - check hba1c\par \par Vitamin D def'y - cont vitamin D supplementation, repeat level next visit\par \par \par f/u one year

## 2020-09-15 NOTE — HISTORY OF PRESENT ILLNESS
[Home] : at home, [unfilled] , at the time of the visit. [Medical Office: ([facility])___] : at the medical office located in  [FreeTextEntry1] : cc: thyroid cancer\par \par 45 year old woman with papillary thyroid cancer post thyroidectomy in 2011 and radioiodine tx in 2013.  She takes levothyroxine daily on an empty stomach, separate from food and vitamins. .  She has not noted any changes in her neck, reports no dyspnea or dysphagia.  Voice feels strained (since the surgery)\par \par Now back to work, teaching (in person and remote) \par \par Prediabetes - had been walking daily, has decreased since going back to work.  Has been careful with diet\par \par Vitamin D Def'y- taking supplements intermittently, 2000 units about 3 per week

## 2020-10-08 ENCOUNTER — APPOINTMENT (OUTPATIENT)
Dept: CARDIOLOGY | Facility: CLINIC | Age: 45
End: 2020-10-08
Payer: COMMERCIAL

## 2020-10-08 ENCOUNTER — NON-APPOINTMENT (OUTPATIENT)
Age: 45
End: 2020-10-08

## 2020-10-08 VITALS
SYSTOLIC BLOOD PRESSURE: 139 MMHG | WEIGHT: 225 LBS | HEIGHT: 64 IN | OXYGEN SATURATION: 99 % | TEMPERATURE: 97.9 F | DIASTOLIC BLOOD PRESSURE: 84 MMHG | BODY MASS INDEX: 38.41 KG/M2 | HEART RATE: 77 BPM

## 2020-10-08 DIAGNOSIS — Z87.42 PERSONAL HISTORY OF OTHER DISEASES OF THE FEMALE GENITAL TRACT: ICD-10-CM

## 2020-10-08 DIAGNOSIS — Z01.419 ENCOUNTER FOR GYNECOLOGICAL EXAMINATION (GENERAL) (ROUTINE) W/OUT ABNORMAL FINDINGS: ICD-10-CM

## 2020-10-08 DIAGNOSIS — Z87.898 PERSONAL HISTORY OF OTHER SPECIFIED CONDITIONS: ICD-10-CM

## 2020-10-08 PROCEDURE — 93000 ELECTROCARDIOGRAM COMPLETE: CPT

## 2020-10-08 PROCEDURE — 99214 OFFICE O/P EST MOD 30 MIN: CPT

## 2020-10-08 NOTE — PHYSICAL EXAM
[General Appearance - Well Developed] : well developed [Normal Appearance] : normal appearance [Well Groomed] : well groomed [No Deformities] : no deformities [General Appearance - In No Acute Distress] : no acute distress [Normal Conjunctiva] : the conjunctiva exhibited no abnormalities [Eyelids - No Xanthelasma] : the eyelids demonstrated no xanthelasmas [Normal Oral Mucosa] : normal oral mucosa [No Oral Pallor] : no oral pallor [No Oral Cyanosis] : no oral cyanosis [Normal Jugular Venous A Waves Present] : normal jugular venous A waves present [Normal Jugular Venous V Waves Present] : normal jugular venous V waves present [No Jugular Venous Castillo A Waves] : no jugular venous castillo A waves [Respiration, Rhythm And Depth] : normal respiratory rhythm and effort [Exaggerated Use Of Accessory Muscles For Inspiration] : no accessory muscle use [Auscultation Breath Sounds / Voice Sounds] : lungs were clear to auscultation bilaterally [Heart Rate And Rhythm] : heart rate and rhythm were normal [Heart Sounds] : normal S1 and S2 [Murmurs] : no murmurs present [Abdomen Soft] : soft [Abdomen Tenderness] : non-tender [Abdomen Mass (___ Cm)] : no abdominal mass palpated [Abnormal Walk] : normal gait [Gait - Sufficient For Exercise Testing] : the gait was sufficient for exercise testing [Nail Clubbing] : no clubbing of the fingernails [Cyanosis, Localized] : no localized cyanosis [Petechial Hemorrhages (___cm)] : no petechial hemorrhages [Skin Color & Pigmentation] : normal skin color and pigmentation [] : no rash [No Venous Stasis] : no venous stasis [Skin Lesions] : no skin lesions [No Skin Ulcers] : no skin ulcer [No Xanthoma] : no  xanthoma was observed [Oriented To Time, Place, And Person] : oriented to person, place, and time [Affect] : the affect was normal [No Anxiety] : not feeling anxious [Mood] : the mood was normal [FreeTextEntry1] : obese

## 2020-10-08 NOTE — REASON FOR VISIT
[FreeTextEntry1] : This is the 7 th  office visit for this 45 - year-old white female who comes in with a history of left cheek tingling paresthesias as well as tingling paresthesias of her left hand. She has had an EKG and a 30 day monitor. The patient states that in the past 4 months she has had no further chest pain and the numbness is very fleeting lasting just seconds and has happened only a couple of times in the last 4 months .Recently she was walking backwards and tripped over her child's bicycle and hit her coccyx and her head. She went to the emergency room and he did a CAT scan which was negative. She does have an appointment to followup with her neurologist within a week.\par 2019: The patient gets short-lived left-sided anterior chest discomforts that feel like a pressure on the last less than a minute. Not specifically related to activity. She is no shortness of breath but occasionally she feels her heart rate speed up.\par  2019: The patient's chest pains are less frequent. When they have been there only several seconds. Sometimes when she awakens After having fallen asleep, she feels her heart beating stronger. Her echocardiogram on 2019 showed mild to moderate mitral regurgitation. She takes no medications except for Synthroid and an occasional vitamin D.\par 2020: Patient gets headaches sometimes in the back of her head sometimes in the top of her head.  She has seen a neurologist about this.  Sometimes she gets some chest pressure on her left anterior chest but it only lasts for seconds at a time.  She denies any shortness of breath but does get occasional palpitations.\par 2020: Patient had a 2-week period where she lost her sense of smell and taste back in 2020.  She just went to her gynecologist and had a blood test including COVID-19 antibodies which were positive.  She is still getting some tingling paresthesias on her left cheek.  She gets occasional palpitations.  She denies any chest pains or shortness of breath.  She is bicycle riding and walking more than she normally does and has no symptoms during those activities.\par 2020: Patient still gets occasional chest pains but they have not changed in pattern or severity.  She denies any shortness of breath but does get palpitations.  She was having pain in front of her ears and went to an ear nose and throat doctor who said she was grinding her teeth.  She will start to use her bite plate again.  He also did an indirect laryngoscopy and said that she had some evidence of reflux.  He directed her to come to her internist and ask about treatment.\par The patient is status post 2 full-term pregnancies, 2 C- sections, and she has 2 living children.\par \par Patient never smoked and only has rare alcoholic beverages. She has 1-2 cups of caffeinated coffee a day but she's been recently trying to cut down.\par \par Mother has prediabetes and has been recently diagnosed with some hypertension. Her father  with end-stage kidney disease.\par \par She takes levothyroxine, Claritin PRN , and when she remembers vitamin D.\par \par She has no known drug allergies.\par \par She has been getting atypical chest pains, and palpitations, but no shortness of breath.

## 2020-10-08 NOTE — DISCUSSION/SUMMARY
[FreeTextEntry1] : The patient was examined. Her blood pressure was 139/84,and  her pulse was 77. Her lungs were clear to auscultation. Cardiac exam was  negative for murmurs rubs or gallops. Her EKG showed normal sinus rhythm and no acute changes are seen. .. She will return in approximately 4  months ,or earlier if needed. She'll continue her current medication, and we will add generic Pepcid for her reflux.

## 2020-10-27 ENCOUNTER — NON-APPOINTMENT (OUTPATIENT)
Age: 45
End: 2020-10-27

## 2020-11-03 ENCOUNTER — LABORATORY RESULT (OUTPATIENT)
Age: 45
End: 2020-11-03

## 2020-11-03 ENCOUNTER — APPOINTMENT (OUTPATIENT)
Dept: OBGYN | Facility: CLINIC | Age: 45
End: 2020-11-03
Payer: COMMERCIAL

## 2020-11-03 VITALS
BODY MASS INDEX: 38.41 KG/M2 | TEMPERATURE: 98 F | DIASTOLIC BLOOD PRESSURE: 91 MMHG | HEIGHT: 64 IN | HEART RATE: 91 BPM | SYSTOLIC BLOOD PRESSURE: 137 MMHG | WEIGHT: 225 LBS

## 2020-11-03 PROCEDURE — 57500 BIOPSY OF CERVIX: CPT

## 2020-11-03 PROCEDURE — 99213 OFFICE O/P EST LOW 20 MIN: CPT | Mod: 25

## 2020-11-03 PROCEDURE — 99072 ADDL SUPL MATRL&STAF TM PHE: CPT

## 2020-11-03 NOTE — HISTORY OF PRESENT ILLNESS
[FreeTextEntry1] : pt presents for follow up , LNMP 10/9/20, followed by bleeding 10/25/20, heavy,  x 1 week. stopped 48 hours ago.  Aug. 27, 2020. pt has hx of DUB. pt denies any abdominal pain assoc. with the abnormal bleeding pattern.

## 2020-11-03 NOTE — PHYSICAL EXAM
[Labia Majora] : normal [Labia Minora] : normal [Polyp ___ cm] : [unfilled] ~[facility] polyp [Tissue Removed] : tissue removed [Normal] : normal [Anteversion] : anteverted [Uterine Adnexae] : normal

## 2020-11-04 LAB
ALBUMIN SERPL ELPH-MCNC: 5 G/DL
ALP BLD-CCNC: 70 U/L
ALT SERPL-CCNC: 30 U/L
ANION GAP SERPL CALC-SCNC: 16 MMOL/L
AST SERPL-CCNC: 19 U/L
BASOPHILS # BLD AUTO: 0.12 K/UL
BASOPHILS NFR BLD AUTO: 1.2 %
BILIRUB SERPL-MCNC: 0.8 MG/DL
BUN SERPL-MCNC: 16 MG/DL
CALCIUM SERPL-MCNC: 9.6 MG/DL
CHLORIDE SERPL-SCNC: 102 MMOL/L
CO2 SERPL-SCNC: 26 MMOL/L
CREAT SERPL-MCNC: 0.58 MG/DL
DHEA-S SERPL-MCNC: 147 UG/DL
EOSINOPHIL # BLD AUTO: 0.26 K/UL
EOSINOPHIL NFR BLD AUTO: 2.5 %
ESTIMATED AVERAGE GLUCOSE: 117 MG/DL
FSH SERPL-MCNC: 6.6 IU/L
GLUCOSE SERPL-MCNC: 59 MG/DL
HBA1C MFR BLD HPLC: 5.7 %
HCT VFR BLD CALC: 35.6 %
HGB BLD-MCNC: 10.4 G/DL
IMM GRANULOCYTES NFR BLD AUTO: 0.4 %
INSULIN SERPL-MCNC: 29.4 UU/ML
LH SERPL-ACNC: 10.9 IU/L
LYMPHOCYTES # BLD AUTO: 3.4 K/UL
LYMPHOCYTES NFR BLD AUTO: 33.2 %
MAN DIFF?: NORMAL
MCHC RBC-ENTMCNC: 19.7 PG
MCHC RBC-ENTMCNC: 29.2 GM/DL
MCV RBC AUTO: 67.4 FL
MONOCYTES # BLD AUTO: 0.66 K/UL
MONOCYTES NFR BLD AUTO: 6.5 %
NEUTROPHILS # BLD AUTO: 5.75 K/UL
NEUTROPHILS NFR BLD AUTO: 56.2 %
PLATELET # BLD AUTO: 338 K/UL
POTASSIUM SERPL-SCNC: 4.4 MMOL/L
PROLACTIN SERPL-MCNC: 39.7 NG/ML
PROT SERPL-MCNC: 7.1 G/DL
RBC # BLD: 5.28 M/UL
RBC # FLD: 17.2 %
SODIUM SERPL-SCNC: 144 MMOL/L
TSH SERPL-ACNC: 2.61 UIU/ML
WBC # FLD AUTO: 10.23 K/UL

## 2020-11-09 LAB
17OHP SERPL-MCNC: 40 NG/DL
ANDROST SERPL-MCNC: 83 NG/DL
CORE LAB BIOPSY: NORMAL
DHEA SERPL-MCNC: 395 NG/DL

## 2020-11-11 ENCOUNTER — APPOINTMENT (OUTPATIENT)
Dept: OBGYN | Facility: CLINIC | Age: 45
End: 2020-11-11
Payer: MEDICARE

## 2020-11-11 ENCOUNTER — ASOB RESULT (OUTPATIENT)
Age: 45
End: 2020-11-11

## 2020-11-11 LAB
TESTOST BND SERPL-MCNC: 0.9 PG/ML
TESTOST SERPL-MCNC: 14.7 NG/DL

## 2020-11-11 PROCEDURE — 76830 TRANSVAGINAL US NON-OB: CPT

## 2020-11-11 PROCEDURE — 99072 ADDL SUPL MATRL&STAF TM PHE: CPT

## 2021-01-14 ENCOUNTER — NON-APPOINTMENT (OUTPATIENT)
Age: 46
End: 2021-01-14

## 2021-01-14 ENCOUNTER — APPOINTMENT (OUTPATIENT)
Dept: CARDIOLOGY | Facility: CLINIC | Age: 46
End: 2021-01-14
Payer: COMMERCIAL

## 2021-01-14 VITALS
HEIGHT: 64 IN | DIASTOLIC BLOOD PRESSURE: 89 MMHG | SYSTOLIC BLOOD PRESSURE: 146 MMHG | BODY MASS INDEX: 40.46 KG/M2 | HEART RATE: 89 BPM | WEIGHT: 237 LBS | OXYGEN SATURATION: 100 %

## 2021-01-14 DIAGNOSIS — R06.02 SHORTNESS OF BREATH: ICD-10-CM

## 2021-01-14 PROCEDURE — 99072 ADDL SUPL MATRL&STAF TM PHE: CPT

## 2021-01-14 PROCEDURE — 99214 OFFICE O/P EST MOD 30 MIN: CPT

## 2021-01-14 PROCEDURE — 93000 ELECTROCARDIOGRAM COMPLETE: CPT

## 2021-01-14 NOTE — REASON FOR VISIT
[FreeTextEntry1] : This is the 8 th  office visit for this 45 - year-old white female who comes in with a history of left cheek tingling paresthesias as well as tingling paresthesias of her left hand. She has had an EKG and a 30 day monitor. The patient states that in the past 4 months she has had no further chest pain and the numbness is very fleeting lasting just seconds and has happened only a couple of times in the last 4 months .Recently she was walking backwards and tripped over her child's bicycle and hit her coccyx and her head. She went to the emergency room and he did a CAT scan which was negative. She does have an appointment to followup with her neurologist within a week.\par 2019: The patient gets short-lived left-sided anterior chest discomforts that feel like a pressure on the last less than a minute. Not specifically related to activity. She is no shortness of breath but occasionally she feels her heart rate speed up.\par  2019: The patient's chest pains are less frequent. When they have been there only several seconds. Sometimes when she awakens After having fallen asleep, she feels her heart beating stronger. Her echocardiogram on 2019 showed mild to moderate mitral regurgitation. She takes no medications except for Synthroid and an occasional vitamin D.\par 2020: Patient gets headaches sometimes in the back of her head sometimes in the top of her head.  She has seen a neurologist about this.  Sometimes she gets some chest pressure on her left anterior chest but it only lasts for seconds at a time.  She denies any shortness of breath but does get occasional palpitations.\par 2020: Patient had a 2-week period where she lost her sense of smell and taste back in 2020.  She just went to her gynecologist and had a blood test including COVID-19 antibodies which were positive.  She is still getting some tingling paresthesias on her left cheek.  She gets occasional palpitations.  She denies any chest pains or shortness of breath.  She is bicycle riding and walking more than she normally does and has no symptoms during those activities.\par 2020: Patient still gets occasional chest pains but they have not changed in pattern or severity.  She denies any shortness of breath but does get palpitations.  She was having pain in front of her ears and went to an ear nose and throat doctor who said she was grinding her teeth.  She will start to use her bite plate again.  He also did an indirect laryngoscopy and said that she had some evidence of reflux.  He directed her to come to her internist and ask about treatment.\par 2021: The patient has been getting chest pressure.  He can last an hour and a half.  It can happen just sitting at her computer.  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 She has 1-2 cups of caffeinated coffee a day but she's been recently trying to cut down.\par \par Mother has prediabetes and has been recently diagnosed with some hypertension. Her father  with end-stage kidney disease.\par \par She takes levothyroxine, Claritin PRN , and when she remembers vitamin D.\par \par She has no known drug allergies.\par \par She has been getting atypical chest pains, and palpitations, but no shortness of breath.

## 2021-01-14 NOTE — REVIEW OF SYSTEMS
[Headache] : headache [Palpitations] : palpitations [see HPI] : see HPI [Numbness (Hypesthesia)] : numbness [Tingling (Paresthesia)] : tingling [Negative] : Heme/Lymph [Chest  Pressure] : chest pressure [Shortness Of Breath] : no shortness of breath [Chest Pain] : no chest pain

## 2021-01-27 ENCOUNTER — TRANSCRIPTION ENCOUNTER (OUTPATIENT)
Age: 46
End: 2021-01-27

## 2021-02-23 ENCOUNTER — APPOINTMENT (OUTPATIENT)
Dept: CARDIOLOGY | Facility: CLINIC | Age: 46
End: 2021-02-23
Payer: COMMERCIAL

## 2021-02-23 PROCEDURE — 99442: CPT

## 2021-02-23 NOTE — REASON FOR VISIT
[FreeTextEntry1] : PHONE VISIT\par \par TeleHealth Phone Encounter:\par \par Initiated by:\par __Patient Call\par _X_Patient Election for TeleHealth visit\par \par Consented for TeleHealth visit\par Patient Location:  Home\par Physician Location:\par X__Office(238; [address], [address], [place], [place], [address])\par __Home\par \par Narrative: The patient's son was tested positive for Covid.  She started getting a headache and was just checked for Covid virus with a nasal swab which was negative.  They also informed her that her blood pressure was high.  The highest it had been when they informed her of this was 154/94.  She wants to know whether she should still get the shot or she get an antibody test first.  She had high antibodies last year.  She was told to get the shot.\par \par Assessment: Exposure to Covid but Covid negative\par \par Plan: Get Covid shots.\par \par \par Follow-up: 3 months, or earlier if needed.\par \par  \par Duration of Encounter:  ___15 minutes, at least 50% of which was spent in direct counseling and coordination of care\par \par \par \par

## 2021-04-06 ENCOUNTER — APPOINTMENT (OUTPATIENT)
Dept: CARDIOLOGY | Facility: CLINIC | Age: 46
End: 2021-04-06

## 2021-04-20 ENCOUNTER — RX RENEWAL (OUTPATIENT)
Age: 46
End: 2021-04-20

## 2021-05-03 ENCOUNTER — TRANSCRIPTION ENCOUNTER (OUTPATIENT)
Age: 46
End: 2021-05-03

## 2021-05-03 NOTE — DISCUSSION/SUMMARY
[FreeTextEntry1] : A - DUB\par     obesity\par      Hypothyroidism\par      cervical polyp \par \par P- FSH, LH, TSH, PRL, serum A, serum T, DHEA-S, serum insulin , 17-OHP, \par        sono to evaluate uterus and adnexae\par       cervical polypectomy performed ( incidental finding on exam). 
pt c/o intermittent chest pain and lightheaded throughout the day.  also reports pain to left arm.

## 2021-05-06 ENCOUNTER — APPOINTMENT (OUTPATIENT)
Dept: OBGYN | Facility: CLINIC | Age: 46
End: 2021-05-06

## 2021-05-07 ENCOUNTER — APPOINTMENT (OUTPATIENT)
Dept: OBGYN | Facility: CLINIC | Age: 46
End: 2021-05-07
Payer: COMMERCIAL

## 2021-05-07 VITALS
BODY MASS INDEX: 40.97 KG/M2 | SYSTOLIC BLOOD PRESSURE: 154 MMHG | WEIGHT: 240 LBS | HEART RATE: 94 BPM | DIASTOLIC BLOOD PRESSURE: 91 MMHG | HEIGHT: 64 IN

## 2021-05-07 PROCEDURE — 99072 ADDL SUPL MATRL&STAF TM PHE: CPT

## 2021-05-07 PROCEDURE — 99212 OFFICE O/P EST SF 10 MIN: CPT

## 2021-05-07 RX ORDER — PNV NO.95/FERROUS FUM/FOLIC AC 28MG-0.8MG
TABLET ORAL
Refills: 0 | Status: DISCONTINUED | COMMUNITY
End: 2021-05-07

## 2021-05-07 RX ORDER — PREDNISONE 50 MG/1
TABLET ORAL
Refills: 0 | Status: ACTIVE | COMMUNITY

## 2021-05-07 NOTE — PHYSICAL EXAM
[Labia Majora] : normal [Labia Minora] : normal [Normal] : normal [FreeTextEntry1] : posterior introitus with 6mm flesh colored raised area, no pointing, non-tender

## 2021-05-20 ENCOUNTER — APPOINTMENT (OUTPATIENT)
Dept: CARDIOLOGY | Facility: CLINIC | Age: 46
End: 2021-05-20
Payer: COMMERCIAL

## 2021-05-20 ENCOUNTER — NON-APPOINTMENT (OUTPATIENT)
Age: 46
End: 2021-05-20

## 2021-05-20 VITALS
HEART RATE: 83 BPM | OXYGEN SATURATION: 100 % | BODY MASS INDEX: 40.97 KG/M2 | DIASTOLIC BLOOD PRESSURE: 85 MMHG | HEIGHT: 64 IN | WEIGHT: 240 LBS | SYSTOLIC BLOOD PRESSURE: 122 MMHG

## 2021-05-20 PROCEDURE — 99214 OFFICE O/P EST MOD 30 MIN: CPT

## 2021-05-20 PROCEDURE — 93000 ELECTROCARDIOGRAM COMPLETE: CPT

## 2021-05-20 PROCEDURE — 99072 ADDL SUPL MATRL&STAF TM PHE: CPT

## 2021-05-20 NOTE — DISCUSSION/SUMMARY
[FreeTextEntry1] : The patient was examined. Her blood pressure was 122/85 ,and  her pulse was 83. Her lungs were clear to auscultation. Cardiac exam was  negative for murmurs rubs or gallops. Her EKG showed normal sinus rhythm and no acute changes are seen. .. She will return in approximately 4  months ,or earlier if needed. She'll continue her current medication.  Because of the atypical chest pressure and some shortness of breath we will send the patient for an echocardiogram to check on her valvular heart disease and for LV size and function.  Because of the atypical chest pressure we will send her for a treadmill stress test.  Total time spent on the day of the encounter was 32 minutes which includes  face-to-face and non face-to-face times personally spent by the physician preparing to see the patient, obtaining  separately obtained history, performing a medically appropriate exam and evaluation, counseling, educating, talking to the family or caregivers, ordering medicines, ordering tests or procedures, referring and communicating with other healthcare foods professionals, and documenting clinical information in the electronic health record.

## 2021-05-20 NOTE — REASON FOR VISIT
[FreeTextEntry1] : This is the 9 th  office visit for this 45 year-old white female who comes in with a history of left cheek tingling paresthesias as well as tingling paresthesias of her left hand. She has had an EKG and a 30 day monitor. The patient states that in the past 4 months she has had no further chest pain and the numbness is very fleeting lasting just seconds and has happened only a couple of times in the last 4 months .Recently she was walking backwards and tripped over her child's bicycle and hit her coccyx and her head. She went to the emergency room and he did a CAT scan which was negative. She does have an appointment to followup with her neurologist within a week.\par 2019: The patient gets short-lived left-sided anterior chest discomforts that feel like a pressure on the last less than a minute. Not specifically related to activity. She is no shortness of breath but occasionally she feels her heart rate speed up.\par  2019: The patient's chest pains are less frequent. When they have been there only several seconds. Sometimes when she awakens After having fallen asleep, she feels her heart beating stronger. Her echocardiogram on 2019 showed mild to moderate mitral regurgitation. She takes no medications except for Synthroid and an occasional vitamin D.\par 2020: Patient gets headaches sometimes in the back of her head sometimes in the top of her head.  She has seen a neurologist about this.  Sometimes she gets some chest pressure on her left anterior chest but it only lasts for seconds at a time.  She denies any shortness of breath but does get occasional palpitations.\par 2020: Patient had a 2-week period where she lost her sense of smell and taste back in 2020.  She just went to her gynecologist and had a blood test including COVID-19 antibodies which were positive.  She is still getting some tingling paresthesias on her left cheek.  She gets occasional palpitations.  She denies any chest pains or shortness of breath.  She is bicycle riding and walking more than she normally does and has no symptoms during those activities.\par 2020: Patient still gets occasional chest pains but they have not changed in pattern or severity.  She denies any shortness of breath but does get palpitations.  She was having pain in front of her ears and went to an ear nose and throat doctor who said she was grinding her teeth.  She will start to use her bite plate again.  He also did an indirect laryngoscopy and said that she had some evidence of reflux.  He directed her to come to her internist and ask about treatment.\par 2021: The patient has been getting chest pressure.  He can last an hour and a half.  It can happen just sitting at her computer.  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  She got her vaccines for COVID-19.  She denies any chest pains, shortness of breath, or palpitations.  She has not had a chance to do the stress test or echo because of scheduling.  She is still taking famotidine for her reflux disease and it is working well.\par The patient is status post 2 full-term pregnancies, 2 C- sections, and she has 2 living children.\par \par Patient never smoked and only has rare alcoholic beverages. She has 1-2 cups of caffeinated coffee a day but she's been recently trying to cut down.\par \par Mother has prediabetes and has been recently diagnosed with some hypertension. Her father  with end-stage kidney disease.\par \par She takes levothyroxine, Claritin PRN , and when she remembers vitamin D.\par \par She has no known drug allergies.\par \par She has been getting atypical chest pains, and palpitations, but no shortness of breath.

## 2021-05-20 NOTE — REVIEW OF SYSTEMS
[Rash] : rash [Itching] : itching [Negative] : Heme/Lymph [SOB] : no shortness of breath [Dyspnea on exertion] : not dyspnea during exertion [Chest Discomfort] : no chest discomfort [Lower Ext Edema] : no extremity edema [Leg Claudication] : no intermittent leg claudication [Palpitations] : no palpitations [Orthopnea] : no orthopnea [PND] : no PND [Syncope] : no syncope

## 2021-05-20 NOTE — PHYSICAL EXAM
[Normal Appearance] : normal appearance [General Appearance - Well Developed] : well developed [Well Groomed] : well groomed [No Deformities] : no deformities [General Appearance - In No Acute Distress] : no acute distress [Normal Conjunctiva] : the conjunctiva exhibited no abnormalities [Eyelids - No Xanthelasma] : the eyelids demonstrated no xanthelasmas [Normal Oral Mucosa] : normal oral mucosa [No Oral Pallor] : no oral pallor [No Oral Cyanosis] : no oral cyanosis [Normal Jugular Venous A Waves Present] : normal jugular venous A waves present [Normal Jugular Venous V Waves Present] : normal jugular venous V waves present [No Jugular Venous Castillo A Waves] : no jugular venous castillo A waves [Respiration, Rhythm And Depth] : normal respiratory rhythm and effort [Exaggerated Use Of Accessory Muscles For Inspiration] : no accessory muscle use [Auscultation Breath Sounds / Voice Sounds] : lungs were clear to auscultation bilaterally [Heart Rate And Rhythm] : heart rate and rhythm were normal [Heart Sounds] : normal S1 and S2 [Murmurs] : no murmurs present [Abdomen Soft] : soft [Abdomen Tenderness] : non-tender [Abdomen Mass (___ Cm)] : no abdominal mass palpated [Abnormal Walk] : normal gait [Gait - Sufficient For Exercise Testing] : the gait was sufficient for exercise testing [Nail Clubbing] : no clubbing of the fingernails [Cyanosis, Localized] : no localized cyanosis [Petechial Hemorrhages (___cm)] : no petechial hemorrhages [Skin Color & Pigmentation] : normal skin color and pigmentation [] : no rash [No Venous Stasis] : no venous stasis [No Skin Ulcers] : no skin ulcer [Skin Lesions] : no skin lesions [No Xanthoma] : no  xanthoma was observed [Oriented To Time, Place, And Person] : oriented to person, place, and time [Affect] : the affect was normal [Mood] : the mood was normal [No Anxiety] : not feeling anxious [FreeTextEntry1] : obese

## 2021-07-15 ENCOUNTER — TRANSCRIPTION ENCOUNTER (OUTPATIENT)
Age: 46
End: 2021-07-15

## 2021-08-16 ENCOUNTER — EMERGENCY (EMERGENCY)
Facility: HOSPITAL | Age: 46
LOS: 1 days | Discharge: HOME | End: 2021-08-16
Attending: INTERNAL MEDICINE | Admitting: INTERNAL MEDICINE
Payer: COMMERCIAL

## 2021-08-16 VITALS
RESPIRATION RATE: 20 BRPM | HEIGHT: 65 IN | WEIGHT: 235.01 LBS | OXYGEN SATURATION: 100 % | SYSTOLIC BLOOD PRESSURE: 151 MMHG | HEART RATE: 90 BPM | DIASTOLIC BLOOD PRESSURE: 102 MMHG | TEMPERATURE: 99 F

## 2021-08-16 PROCEDURE — 72110 X-RAY EXAM L-2 SPINE 4/>VWS: CPT

## 2021-08-16 PROCEDURE — 96372 THER/PROPH/DIAG INJ SC/IM: CPT

## 2021-08-16 PROCEDURE — 99284 EMERGENCY DEPT VISIT MOD MDM: CPT

## 2021-08-16 PROCEDURE — 99283 EMERGENCY DEPT VISIT LOW MDM: CPT | Mod: 25

## 2021-08-16 RX ORDER — OXYCODONE AND ACETAMINOPHEN 5; 325 MG/1; MG/1
1 TABLET ORAL EVERY 4 HOURS
Refills: 0 | Status: DISCONTINUED | OUTPATIENT
Start: 2021-08-16 | End: 2021-08-16

## 2021-08-16 RX ORDER — KETOROLAC TROMETHAMINE 30 MG/ML
20 SYRINGE (ML) INJECTION ONCE
Refills: 0 | Status: DISCONTINUED | OUTPATIENT
Start: 2021-08-16 | End: 2021-08-16

## 2021-08-16 RX ORDER — CYCLOBENZAPRINE HYDROCHLORIDE 10 MG/1
10 TABLET, FILM COATED ORAL ONCE
Refills: 0 | Status: COMPLETED | OUTPATIENT
Start: 2021-08-16 | End: 2021-08-16

## 2021-08-16 RX ADMIN — CYCLOBENZAPRINE HYDROCHLORIDE 10 MILLIGRAM(S): 10 TABLET, FILM COATED ORAL at 23:41

## 2021-08-16 RX ADMIN — Medication 20 MILLIGRAM(S): at 23:30

## 2021-08-16 NOTE — ED ADULT NURSE NOTE - OBJECTIVE STATEMENT
Pt. presents to ED from home s/p mechanical fall with injury. Pt. states she fell this morning around 7am down the stairs at home. She took ibuprofen x2 today prior to coming in. Pt. denies blood thinner usage or injury to head. Pt. states her pain is 10/10 to the right lower back and right buttocks where hematoma is present. At rest her pain level is 1/10 which is tolerable for her. Denies vertigo hx, dizziness, LOC, N/V/D, CP or SOB.

## 2021-08-16 NOTE — ED ADULT NURSE NOTE - NSICDXPASTSURGICALHX_GEN_ALL_CORE_FT
PAST SURGICAL HISTORY:  C Section 2009 & 2011    Dermoid Cyst of Ovary Removed in 2009    Myringotomy with Insertion of Tube Placed in 1985 then removed

## 2021-08-16 NOTE — ED PROVIDER NOTE - PATIENT PORTAL LINK FT
You can access the FollowMyHealth Patient Portal offered by [facility] by registering at the following website: [email or web address]. By joining [employer]’s FollowMyHealth portal, you will also be able to view your health information using other applications (apps) compatible with our system.

## 2021-08-16 NOTE — ED PROVIDER NOTE - CARE PROVIDER_API CALL
Juan Mckeon)  Orthopaedic Sports Medicine; Orthopaedic Surgery  [address] [address]  Phone: [phone]  Fax: [phone]  Follow Up Time:

## 2021-08-16 NOTE — ED ADULT NURSE NOTE - NSICDXPASTMEDICALHX_GEN_ALL_CORE_FT
PAST MEDICAL HISTORY:  Allergic Asthma Last attack was more than 5 years ago    GERD (Gastroesophageal Reflux Disease) 10 years ago    Heart Murmur     Malignant Neoplasm of Thyroid Gland     Seasonal Allergies     Thalassemia Trait     Thyroid Nodule

## 2021-08-16 NOTE — ED PROVIDER NOTE - PHYSICAL EXAMINATION
General:     NAD, well-nourished, well-appearing  Head:     NC/AT, EOMI, oral mucosa moist  Neck:     trachea midline  Lungs:     CTA b/l, no w/r/r  CVS:     S1S2, RRR, no m/g/r  Abd:     +BS, s/nt/nd, no organomegaly  Ext:    2+ radial and pedal pulses, no c/c/e  ms sk no midline c spine t spine or l spine tenderness , + R buttock hematoma tenderness  Neuro: AAOx3, no sensory/motor deficits

## 2021-08-16 NOTE — ED ADULT NURSE NOTE - INTERVENTIONS DEFINITIONS
[place] to call system/Non-slip footwear when patient is off stretcher/Physically safe environment: no spills, clutter or unnecessary equipment/Stretcher in lowest position, wheels locked, appropriate side rails in place

## 2021-08-16 NOTE — ED ADULT NURSE NOTE - CHIEF COMPLAINT QUOTE
"I fell at 7am this morning down the steps in my house. Now I have lower back and right buttocks pain. There's a big bruise and I am worried." Pt denies LOC and hitting head. Denies being on blood thinners. Pt states at rest she does not have pain, but when touching around bruised area pain is severe 10/10. Mechanical fall

## 2021-08-16 NOTE — ED PROVIDER NOTE - OBJECTIVE STATEMENT
fell down the steps landed on R buttock + hematoma swelling R buttock pain R suprascapular area 46 y f fell down the steps landed on R buttock + hematoma swelling R buttock pain R suprascapular area  onset sudden   locations lower back R buttock R interscapular area   duration 1 day   characteristics pain and bruising   context fall   aggravating factors motion palpation   relieving factors rest otc meds ice   timming constant   severity moderate

## 2021-08-16 NOTE — ED ADULT TRIAGE NOTE - CHIEF COMPLAINT QUOTE
"I fell at 7am this morning down the steps in my house. Now I have lower back and right buttocks pain. There's a big bruise and I am worried." Pt denies LOC and hitting head. Denies being on blood thinners. Pt states at rest she does not have pain, but when touching around bruised area pain is severe 10/10. "I fell at 7am this morning down the steps in my house. Now I have lower back and right buttocks pain. There's a big bruise and I am worried." Pt denies LOC and hitting head. Denies being on blood thinners. Pt states at rest she does not have pain, but when touching around bruised area pain is severe 10/10. Mechanical fall

## 2021-08-16 NOTE — ED PROVIDER NOTE - NSFOLLOWUPINSTRUCTIONS_ED_ALL_ED_FT
Rest, drink plenty of fluids  Advance activity as tolerated  Continue all previously prescribed medications as directed  Follow up with your PMD 2-3 days- bring copies of your results  Return to the ER for worsening  ice rest

## 2021-08-17 VITALS
OXYGEN SATURATION: 96 % | SYSTOLIC BLOOD PRESSURE: 133 MMHG | HEART RATE: 77 BPM | DIASTOLIC BLOOD PRESSURE: 91 MMHG | RESPIRATION RATE: 18 BRPM

## 2021-08-17 PROCEDURE — 72110 X-RAY EXAM L-2 SPINE 4/>VWS: CPT | Mod: 26

## 2021-08-17 RX ORDER — CYCLOBENZAPRINE HYDROCHLORIDE 10 MG/1
1 TABLET, FILM COATED ORAL
Qty: 30 | Refills: 0
Start: 2021-08-17

## 2021-08-17 RX ORDER — IBUPROFEN 200 MG
1 TABLET ORAL
Qty: 21 | Refills: 0
Start: 2021-08-17 | End: 2021-08-23

## 2021-08-17 RX ADMIN — Medication 20 MILLIGRAM(S): at 00:01

## 2021-09-10 ENCOUNTER — APPOINTMENT (OUTPATIENT)
Dept: ORTHOPEDIC SURGERY | Facility: CLINIC | Age: 46
End: 2021-09-10
Payer: COMMERCIAL

## 2021-09-10 VITALS — BODY MASS INDEX: 37.82 KG/M2 | WEIGHT: 227 LBS | HEIGHT: 65 IN

## 2021-09-10 PROCEDURE — 99203 OFFICE O/P NEW LOW 30 MIN: CPT

## 2021-09-10 NOTE — PHYSICAL EXAM
[de-identified] : Constitutional: Well-nourished, well-developed, No acute distress\par Respiratory:  Good respiratory effort, no SOB\par Lymphatic: No regional lymphadenopathy, no lymphedema\par Psychiatric: Pleasant and normal affect, alert and oriented x3\par Musculoskeletal: normal except where as noted in regional exam\par \par Lumbar Spine Exam\par \par APPEARANCE: + Palpable collection of swelling in the right lumbar/gluteal region, no overlying ecchymosis, no marked deformities or malalignment, normal curvature of the lumbosacral spine.\par POSITIVE TENDERNESS: + Right lumbar tenderness and spasm noted in erector spinae and quadratus lumborum, + tenderness with palpation of noted area of collection of swelling/subdermal hematoma of the right lumbar/gluteal region\par NONTENDER: no bony midline tenderness, no marked tenderness in left lumbar musculature.\par ROM: full, although painful at end range of flexion and extension\par RESISTIVE TESTING: Painful 4/5 resisted flex/ext, sidebending b/l, and rotation\par SPECIAL TESTS: neg SLR b/l, neg ALPHONSO b/l, neg Trendelenburg b/l \par  [de-identified] : I reviewed, interpreted and clinically correlated the following outside imaging studies,\par  EXAM: XR LS SPINE MIN 4 VIEWS\par \par PROCEDURE DATE: 08/17/2021\par \par \par INTERPRETATION: CLINICAL INDICATION: Injury\par \par COMPARISON: None\par \par TECHNIQUE: AP pelvis. AP and lateral views lumbar spine.\par \par FINDINGS: There is no evidence for acute fracture of the osseous pelvis. No widening of the symphysis pubis or sacroiliac joints. No hip dislocation is identified. The visualized portions of the proximal bilateral femurs appear unremarkable. Calcifications noted superior to the greater trochanteric region is likely related to calcific tendinitis/bursitis.\par \par The lumbar vertebrae maintain normal height. Mild intervertebral disc space narrowing is identified L5-S1. No evidence for acute fracture. Vertebral alignment appears unremarkable. The visualized portion of the sacrum appears unremarkable.\par \par IMPRESSION: No evidence for acute fracture. Mild intervertebral disc space narrowing L5-S1.

## 2021-09-10 NOTE — HISTORY OF PRESENT ILLNESS
[de-identified] : Patient is here for LBP that began on 8/16/21 when she fell down stairs. She went to the ER where xrays were taken that were negative for fracture. She was prescribed Flexeril and an NSAID. She states that she has a lump on the right side of her back. She has increased pain when sitting down at the end of the day  and bending over. She has a prior history of sciatica from when she was pregnant but states that this feels different. She describes it as a burning sensation surrounding  the lump. She works as a teacher. Denies N/T/bowel or bladder dysfunction/saddle anesthesia. \par \par The patient's past medical history, past surgical history, medications and allergies were reviewed by me today and documented accordingly. In addition, the patient's family and social history, which were noncontributory to this visit, were reviewed also. Intake form was reviewed. The patient has no family history of arthritis.

## 2021-09-10 NOTE — DISCUSSION/SUMMARY
[de-identified] : Discussed findings of today's exam and possible causes of patient's pain.  Educated patient on their most probable diagnosis of right lumbar spasm with the right lumbar/gluteal subdermal/intramuscular hematoma.  Reviewed possible courses of treatment, and we collaboratively decided best course of treatment at this time will include Conservative management.  Patient will be started on a course of physical therapy to restore normal range of motion and strength as tolerated.  Patient started on a course of oral NSAIDs, prescription given for Naprosyn (We discussed all possible side effects of this medication).  Patient was given Flexeril by the ER, it makes her drowsy when she takes it, she is advised that this is a known side effect and she should only take it in the evening if she is having difficulty with sleeping.  Patient educated that she has a hematoma of the right lumbar/gluteal area which is the reason for the tenderness in that specific location.  She is advised that the hematoma can take upwards of 3-6 weeks to fully resolve.  There is also possibility that it may not become fully reabsorbed and there may be a palpable lump in that area for a prolonged time or possibly even permanently, but it should not be painful long-term.  Patient educated she has some degenerative findings on her x-ray at L4-L5 and L5-S1, however she is not having any radicular signs or symptoms at this time, recommend deferral of MRI.  Patient may continue her regular work duties as an .  Follow up as needed.  Patient appreciates and agrees with current plan.\par \par I work as part of an academic orthopedic group and routinely have a physician in training (resident / fellow) working with me.  Any part of the history and physical exam performed by the physician in training was either directly reviewed and/or replicated by myself.  Any procedure performed by the physician in training was performed under my direct supervision and with the consent of the patient.\par \par This note was generated using dragon medical dictation software.  A reasonable effort has been made for proofreading its contents, but typos may still remain.  If there are any questions or points of clarification needed please notify my office.\par

## 2021-09-23 ENCOUNTER — APPOINTMENT (OUTPATIENT)
Dept: CARDIOLOGY | Facility: CLINIC | Age: 46
End: 2021-09-23
Payer: COMMERCIAL

## 2021-09-23 ENCOUNTER — NON-APPOINTMENT (OUTPATIENT)
Age: 46
End: 2021-09-23

## 2021-09-23 VITALS
RESPIRATION RATE: 17 BRPM | BODY MASS INDEX: 38.49 KG/M2 | OXYGEN SATURATION: 100 % | HEART RATE: 72 BPM | SYSTOLIC BLOOD PRESSURE: 135 MMHG | HEIGHT: 65 IN | WEIGHT: 231 LBS | DIASTOLIC BLOOD PRESSURE: 84 MMHG

## 2021-09-23 PROCEDURE — 93000 ELECTROCARDIOGRAM COMPLETE: CPT

## 2021-09-23 PROCEDURE — 99214 OFFICE O/P EST MOD 30 MIN: CPT

## 2021-09-23 NOTE — REVIEW OF SYSTEMS
[Rash] : rash [Itching] : itching [Negative] : Heme/Lymph [Heartburn] : heartburn [SOB] : no shortness of breath [Dyspnea on exertion] : not dyspnea during exertion [Chest Discomfort] : no chest discomfort [Lower Ext Edema] : no extremity edema [Leg Claudication] : no intermittent leg claudication [Palpitations] : no palpitations [Orthopnea] : no orthopnea [PND] : no PND [Syncope] : no syncope

## 2021-09-23 NOTE — REASON FOR VISIT
[FreeTextEntry1] : This is the 10 th  office visit for this 46 year-old white female who comes in with a history of left cheek tingling paresthesias as well as tingling paresthesias of her left hand. She has had an EKG and a 30 day monitor. The patient states that in the past 4 months she has had no further chest pain and the numbness is very fleeting lasting just seconds and has happened only a couple of times in the last 4 months .Recently she was walking backwards and tripped over her child's bicycle and hit her coccyx and her head. She went to the emergency room and he did a CAT scan which was negative. She does have an appointment to followup with her neurologist within a week.\par 2019: The patient gets short-lived left-sided anterior chest discomforts that feel like a pressure on the last less than a minute. Not specifically related to activity. She is no shortness of breath but occasionally she feels her heart rate speed up.\par  2019: The patient's chest pains are less frequent. When they have been there only several seconds. Sometimes when she awakens After having fallen asleep, she feels her heart beating stronger. Her echocardiogram on 2019 showed mild to moderate mitral regurgitation. She takes no medications except for Synthroid and an occasional vitamin D.\par 2020: Patient gets headaches sometimes in the back of her head sometimes in the top of her head.  She has seen a neurologist about this.  Sometimes she gets some chest pressure on her left anterior chest but it only lasts for seconds at a time.  She denies any shortness of breath but does get occasional palpitations.\par 2020: Patient had a 2-week period where she lost her sense of smell and taste back in 2020.  She just went to her gynecologist and had a blood test including COVID-19 antibodies which were positive.  She is still getting some tingling paresthesias on her left cheek.  She gets occasional palpitations.  She denies any chest pains or shortness of breath.  She is bicycle riding and walking more than she normally does and has no symptoms during those activities.\par 2020: Patient still gets occasional chest pains but they have not changed in pattern or severity.  She denies any shortness of breath but does get palpitations.  She was having pain in front of her ears and went to an ear nose and throat doctor who said she was grinding her teeth.  She will start to use her bite plate again.  He also did an indirect laryngoscopy and said that she had some evidence of reflux.  He directed her to come to her internist and ask about treatment.\par 2021: The patient has been getting chest pressure.  He can last an hour and a half.  It can happen just sitting at her computer.  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  She got her vaccines for COVID-19.  She denies any chest pains, shortness of breath, or palpitations.  She has not had a chance to do the stress test or echo because of scheduling.  She is still taking famotidine for her reflux disease and it is working well.\par 2021: The patient feels well.  She is now teaching second grade.  She got both of her COVID-19 vaccinations.  She denies any palpitations, chest pains, or shortness of breath.\par The patient is status post 2 full-term pregnancies, 2 C- sections, and she has 2 living children.\par \par Patient never smoked and only has rare alcoholic beverages. She has 1-2 cups of caffeinated coffee a day but she's been recently trying to cut down.\par \par Mother has prediabetes and has been recently diagnosed with some hypertension. Her father  with end-stage kidney disease.\par \par She takes levothyroxine, Claritin PRN , and when she remembers vitamin D.\par \par She has no known drug allergies.\par \par She has been getting atypical chest pains, and palpitations, but no shortness of breath.

## 2021-09-23 NOTE — DISCUSSION/SUMMARY
[FreeTextEntry1] : The patient was examined. Her blood pressure was 135/84 ,and  her pulse was 72. Her lungs were clear to auscultation. Cardiac exam was  negative for murmurs rubs or gallops. Her EKG showed normal sinus rhythm and no acute changes are seen. .. She will return in approximately 4  months ,or earlier if needed. She'll continue her current medication.   Total time spent on the day of the encounter was 32 minutes which includes  face-to-face and non face-to-face times personally spent by the physician preparing to see the patient, obtaining  separately obtained history, performing a medically appropriate exam and evaluation, counseling, educating, talking to the family or caregivers, ordering medicines, ordering tests or procedures, referring and communicating with other healthcare foods professionals, and documenting clinical information in the electronic health record.

## 2021-11-30 ENCOUNTER — RX RENEWAL (OUTPATIENT)
Age: 46
End: 2021-11-30

## 2021-12-06 ENCOUNTER — APPOINTMENT (OUTPATIENT)
Dept: ORTHOPEDIC SURGERY | Facility: CLINIC | Age: 46
End: 2021-12-06

## 2021-12-16 ENCOUNTER — APPOINTMENT (OUTPATIENT)
Dept: ENDOCRINOLOGY | Facility: CLINIC | Age: 46
End: 2021-12-16
Payer: COMMERCIAL

## 2021-12-16 VITALS
DIASTOLIC BLOOD PRESSURE: 80 MMHG | WEIGHT: 228 LBS | SYSTOLIC BLOOD PRESSURE: 120 MMHG | BODY MASS INDEX: 37.94 KG/M2 | OXYGEN SATURATION: 96 % | HEART RATE: 77 BPM

## 2021-12-16 PROCEDURE — 99214 OFFICE O/P EST MOD 30 MIN: CPT

## 2021-12-16 NOTE — PHYSICAL EXAM
[Alert] : alert [Healthy Appearance] : healthy appearance [No Acute Distress] : no acute distress [Normal Sclera/Conjunctiva] : normal sclera/conjunctiva [No Proptosis] : no proptosis [No Neck Mass] : no neck mass was observed [No LAD] : no lymphadenopathy [Well Healed Scar] : well healed scar [No Respiratory Distress] : no respiratory distress [Clear to Auscultation] : lungs were clear to auscultation bilaterally [Normal S1, S2] : normal S1 and S2 [Regular Rhythm] : with a regular rhythm [No Edema] : no peripheral edema [Normal Bowel Sounds] : normal bowel sounds [Soft] : abdomen soft [No Spinal Tenderness] : no spinal tenderness [No Involuntary Movements] : no involuntary movements were seen [Normal Strength/Tone] : muscle strength and tone were normal [Abdominal Striae] : no abdominal striae [Normal Reflexes] : deep tendon reflexes were 2+ and symmetric [No Tremors] : no tremors [Normal Affect] : the affect was normal [Normal Mood] : the mood was normal

## 2021-12-16 NOTE — ASSESSMENT
[FreeTextEntry1] : 46 year old woman with papillary thyroid cancer post thyroidectomy in 2011 and radioiodine with excellent response to tx, on levothyroxine, clinically euthyroid\par   - Check TFTs and Tg, adjust levothyroxine dose as needed\par \par Overweight/prediabetes - Encouraged pt to continue with diet and increase exercise\par  - check hba1c\par \par Vitamin D def'y - cont vitamin D supplementation, repeat level next visit\par \par Irregular menses - will check e2, lh, fsh, prolactin\par \par f/u one year

## 2021-12-16 NOTE — HISTORY OF PRESENT ILLNESS
[FreeTextEntry1] : cc: thyroid cancer\par \par 46 year old woman with papillary thyroid cancer post thyroidectomy in 2011 and radioiodine tx in 2013.  She takes levothyroxine 150 micrograms daily on an empty stomach, separate from food and vitamins. .  She reports no changes in her neck, no dyspnea or dysphagia.  \par \par \par Prediabetes - diet and exercise have been inconsistent\par \par Vitamin D Def'y- taking supplemental vitamin D 2000 units \par \par Menses have been somewhat irregular, occasionally missing a period

## 2021-12-28 ENCOUNTER — TRANSCRIPTION ENCOUNTER (OUTPATIENT)
Age: 46
End: 2021-12-28

## 2021-12-30 ENCOUNTER — TRANSCRIPTION ENCOUNTER (OUTPATIENT)
Age: 46
End: 2021-12-30

## 2022-01-20 ENCOUNTER — APPOINTMENT (OUTPATIENT)
Dept: CARDIOLOGY | Facility: CLINIC | Age: 47
End: 2022-01-20
Payer: COMMERCIAL

## 2022-01-20 ENCOUNTER — NON-APPOINTMENT (OUTPATIENT)
Age: 47
End: 2022-01-20

## 2022-01-20 VITALS
HEART RATE: 83 BPM | SYSTOLIC BLOOD PRESSURE: 134 MMHG | OXYGEN SATURATION: 100 % | BODY MASS INDEX: 38.82 KG/M2 | WEIGHT: 233 LBS | DIASTOLIC BLOOD PRESSURE: 90 MMHG | HEIGHT: 65 IN

## 2022-01-20 PROCEDURE — 93000 ELECTROCARDIOGRAM COMPLETE: CPT

## 2022-01-20 PROCEDURE — 99214 OFFICE O/P EST MOD 30 MIN: CPT

## 2022-01-20 NOTE — REVIEW OF SYSTEMS
[Heartburn] : heartburn [Rash] : rash [Itching] : itching [Negative] : Heme/Lymph [Dyspnea on exertion] : dyspnea during exertion [Palpitations] : palpitations [SOB] : no shortness of breath [Chest Discomfort] : no chest discomfort [Lower Ext Edema] : no extremity edema [Leg Claudication] : no intermittent leg claudication [Orthopnea] : no orthopnea [PND] : no PND [Syncope] : no syncope

## 2022-01-20 NOTE — REASON FOR VISIT
[FreeTextEntry1] : This is the 11 th  office visit for this 46 year-old white female who comes in with a history of left cheek tingling paresthesias as well as tingling paresthesias of her left hand. She has had an EKG and a 30 day monitor. The patient states that in the past 4 months she has had no further chest pain and the numbness is very fleeting lasting just seconds and has happened only a couple of times in the last 4 months .Recently she was walking backwards and tripped over her child's bicycle and hit her coccyx and her head. She went to the emergency room and he did a CAT scan which was negative. She does have an appointment to followup with her neurologist within a week.\par 2019: The patient gets short-lived left-sided anterior chest discomforts that feel like a pressure on the last less than a minute. Not specifically related to activity. She is no shortness of breath but occasionally she feels her heart rate speed up.\par  2019: The patient's chest pains are less frequent. When they have been there only several seconds. Sometimes when she awakens After having fallen asleep, she feels her heart beating stronger. Her echocardiogram on 2019 showed mild to moderate mitral regurgitation. She takes no medications except for Synthroid and an occasional vitamin D.\par 2020: Patient gets headaches sometimes in the back of her head sometimes in the top of her head.  She has seen a neurologist about this.  Sometimes she gets some chest pressure on her left anterior chest but it only lasts for seconds at a time.  She denies any shortness of breath but does get occasional palpitations.\par 2020: Patient had a 2-week period where she lost her sense of smell and taste back in 2020.  She just went to her gynecologist and had a blood test including COVID-19 antibodies which were positive.  She is still getting some tingling paresthesias on her left cheek.  She gets occasional palpitations.  She denies any chest pains or shortness of breath.  She is bicycle riding and walking more than she normally does and has no symptoms during those activities.\par 2020: Patient still gets occasional chest pains but they have not changed in pattern or severity.  She denies any shortness of breath but does get palpitations.  She was having pain in front of her ears and went to an ear nose and throat doctor who said she was grinding her teeth.  She will start to use her bite plate again.  He also did an indirect laryngoscopy and said that she had some evidence of reflux.  He directed her to come to her internist and ask about treatment.\par 2021: The patient has been getting chest pressure.  He can last an hour and a half.  It can happen just sitting at her computer.  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  She got her vaccines for COVID-19.  She denies any chest pains, shortness of breath, or palpitations.  She has not had a chance to do the stress test or echo because of scheduling.  She is still taking famotidine for her reflux disease and it is working well.\par 2021: The patient feels well.  She is now teaching second grade.  She got both of her COVID-19 vaccinations.  She denies any palpitations, chest pains, or shortness of breath.\par The patient is status post 2 full-term pregnancies, 2 C- sections, and she has 2 living children.\par 2022: 3 weeks ago the patient got COVID.  She now notices that she gets dyspnea on exertion when doing normal things.  She denies any shortness of breath at rest, chest pains, but does get occasional palpitations when worrying about things\par \par Patient never smoked and only has rare alcoholic beverages. She has 1-2 cups of caffeinated coffee a day but she's been recently trying to cut down.\par \par Mother has prediabetes and has been recently diagnosed with some hypertension. Her father  with end-stage kidney disease.\par \par She takes levothyroxine, Claritin PRN , and when she remembers vitamin D.\par \par She has no known drug allergies.\par \par She has been getting atypical chest pains, and palpitations, but no shortness of breath.

## 2022-01-20 NOTE — DISCUSSION/SUMMARY
[FreeTextEntry1] : The patient was examined. Her blood pressure was 134/90 ,and  her pulse was 83. Her lungs were clear to auscultation. Cardiac exam was  negative for murmurs rubs or gallops. Her EKG showed normal sinus rhythm and no acute changes are seen. .. She will return in approximately 4  months ,or earlier if needed. She'll continue her current medication.   Total time spent on the day of the encounter was 32 minutes which includes  face-to-face and non face-to-face times personally spent by the physician preparing to see the patient, obtaining  separately obtained history, performing a medically appropriate exam and evaluation, counseling, educating, talking to the family or caregivers, ordering medicines, ordering tests or procedures, referring and communicating with other healthcare  professionals, and documenting clinical information in the electronic health record.  Because of the recent COVID infection, dyspnea on exertion, and palpitations we will send the patient for an echocardiogram for LV size and function.

## 2022-02-09 ENCOUNTER — APPOINTMENT (OUTPATIENT)
Dept: CARDIOLOGY | Facility: CLINIC | Age: 47
End: 2022-02-09
Payer: COMMERCIAL

## 2022-02-09 PROCEDURE — 93306 TTE W/DOPPLER COMPLETE: CPT

## 2022-04-19 ENCOUNTER — LABORATORY RESULT (OUTPATIENT)
Age: 47
End: 2022-04-19

## 2022-04-19 ENCOUNTER — APPOINTMENT (OUTPATIENT)
Dept: OBGYN | Facility: CLINIC | Age: 47
End: 2022-04-19
Payer: COMMERCIAL

## 2022-04-19 VITALS
HEIGHT: 65 IN | HEART RATE: 82 BPM | BODY MASS INDEX: 38.69 KG/M2 | WEIGHT: 232.2 LBS | DIASTOLIC BLOOD PRESSURE: 84 MMHG | SYSTOLIC BLOOD PRESSURE: 141 MMHG

## 2022-04-19 DIAGNOSIS — N92.6 IRREGULAR MENSTRUATION, UNSPECIFIED: ICD-10-CM

## 2022-04-19 PROCEDURE — 99396 PREV VISIT EST AGE 40-64: CPT

## 2022-04-19 NOTE — HISTORY OF PRESENT ILLNESS
[FreeTextEntry1] : 47 y.o. P 2002 LMP 4/2/22 pt reports increasing cycle irregularity, . pt is s/p total thyroidectomy and is on Synthroid 150mcg daily, . Endocrinologist is Gertrudis Vu M.D. pt is a teacher \par pt is due for pap , will repeat FSH, LH, and will draw TFT's for Endo to review. \par pt is also due for mammo and breast sono

## 2022-04-19 NOTE — DISCUSSION/SUMMARY
[FreeTextEntry1] : A - WWV\par       Hypothyroidism ( surgical)\par     Obesity\par     LLQ pain \par     r/o female climacteric state\par \par P- f/u for today's results\par     pap done \par    referral for mammo ,breast sono and pelvic sono given \par      Thyroid function labs drawn for Dr. Gertrudis Vu. \par      exercise encouraged\par     nutritional counseling provided,

## 2022-04-20 LAB
25(OH)D3 SERPL-MCNC: 30.2 NG/ML
ESTIMATED AVERAGE GLUCOSE: 120 MG/DL
FSH SERPL-MCNC: 7.3 IU/L
HBA1C MFR BLD HPLC: 5.8 %
HPV HIGH+LOW RISK DNA PNL CVX: NOT DETECTED
LH SERPL-ACNC: 11.7 IU/L
T3FREE SERPL-MCNC: 2.96 PG/ML
T3RU NFR SERPL: 1.1 TBI
T4 FREE SERPL-MCNC: 1.5 NG/DL
T4 SERPL-MCNC: 8.3 UG/DL
THYROGLOB AB SERPL-ACNC: <20 IU/ML
THYROGLOB SERPL-MCNC: <0.2 NG/ML
TSH SERPL-ACNC: 0.49 UIU/ML

## 2022-04-24 LAB — CYTOLOGY CVX/VAG DOC THIN PREP: NORMAL

## 2022-04-28 ENCOUNTER — RX RENEWAL (OUTPATIENT)
Age: 47
End: 2022-04-28

## 2022-05-19 ENCOUNTER — NON-APPOINTMENT (OUTPATIENT)
Age: 47
End: 2022-05-19

## 2022-05-19 ENCOUNTER — APPOINTMENT (OUTPATIENT)
Dept: CARDIOLOGY | Facility: CLINIC | Age: 47
End: 2022-05-19
Payer: COMMERCIAL

## 2022-05-19 VITALS
BODY MASS INDEX: 39.49 KG/M2 | OXYGEN SATURATION: 99 % | DIASTOLIC BLOOD PRESSURE: 88 MMHG | SYSTOLIC BLOOD PRESSURE: 141 MMHG | HEIGHT: 65 IN | WEIGHT: 237 LBS | HEART RATE: 76 BPM

## 2022-05-19 DIAGNOSIS — Z01.419 ENCOUNTER FOR GYNECOLOGICAL EXAMINATION (GENERAL) (ROUTINE) W/OUT ABNORMAL FINDINGS: ICD-10-CM

## 2022-05-19 DIAGNOSIS — R10.2 PELVIC AND PERINEAL PAIN: ICD-10-CM

## 2022-05-19 DIAGNOSIS — N84.1 POLYP OF CERVIX UTERI: ICD-10-CM

## 2022-05-19 DIAGNOSIS — Z87.42 PERSONAL HISTORY OF OTHER DISEASES OF THE FEMALE GENITAL TRACT: ICD-10-CM

## 2022-05-19 DIAGNOSIS — M62.830 MUSCLE SPASM OF BACK: ICD-10-CM

## 2022-05-19 PROCEDURE — 99214 OFFICE O/P EST MOD 30 MIN: CPT

## 2022-05-19 PROCEDURE — 93000 ELECTROCARDIOGRAM COMPLETE: CPT

## 2022-05-19 NOTE — REVIEW OF SYSTEMS
[Dyspnea on exertion] : dyspnea during exertion [Palpitations] : palpitations [Heartburn] : heartburn [Rash] : rash [Itching] : itching [Negative] : Heme/Lymph [Chest Discomfort] : chest discomfort [SOB] : no shortness of breath [Lower Ext Edema] : no extremity edema [Leg Claudication] : no intermittent leg claudication [Orthopnea] : no orthopnea [PND] : no PND [Syncope] : no syncope

## 2022-05-19 NOTE — DISCUSSION/SUMMARY
[FreeTextEntry1] : The patient was examined. Her blood pressure was 141/88 ,and  her pulse was 76. Her lungs were clear to auscultation. Cardiac exam was  negative for murmurs rubs or gallops. Her EKG showed normal sinus rhythm and no acute changes are seen. .. She will return in approximately 4  months ,or earlier if needed. She'll continue her current medication.   Total time spent on the day of the encounter was 31 minutes which includes  face-to-face and non face-to-face times personally spent by the physician preparing to see the patient, obtaining  separately obtained history, performing a medically appropriate exam and evaluation, counseling, educating, talking to the family or caregivers, ordering medicines, ordering tests or procedures, referring and communicating with other healthcare  professionals, and documenting clinical information in the electronic health record. 
BMI 34.3 kg/m2/obese
.

## 2022-05-19 NOTE — REASON FOR VISIT
[FreeTextEntry1] : This is the 12 th  office visit for this 47 year-old white female who comes in with a history of left cheek tingling paresthesias as well as tingling paresthesias of her left hand. She has had an EKG and a 30 day monitor. The patient states that in the past 4 months she has had no further chest pain and the numbness is very fleeting lasting just seconds and has happened only a couple of times in the last 4 months .Recently she was walking backwards and tripped over her child's bicycle and hit her coccyx and her head. She went to the emergency room and he did a CAT scan which was negative. She does have an appointment to followup with her neurologist within a week.\par 2019: The patient gets short-lived left-sided anterior chest discomforts that feel like a pressure on the last less than a minute. Not specifically related to activity. She is no shortness of breath but occasionally she feels her heart rate speed up.\par  2019: The patient's chest pains are less frequent. When they have been there only several seconds. Sometimes when she awakens After having fallen asleep, she feels her heart beating stronger. Her echocardiogram on 2019 showed mild to moderate mitral regurgitation. She takes no medications except for Synthroid and an occasional vitamin D.\par 2020: Patient gets headaches sometimes in the back of her head sometimes in the top of her head.  She has seen a neurologist about this.  Sometimes she gets some chest pressure on her left anterior chest but it only lasts for seconds at a time.  She denies any shortness of breath but does get occasional palpitations.\par 2020: Patient had a 2-week period where she lost her sense of smell and taste back in 2020.  She just went to her gynecologist and had a blood test including COVID-19 antibodies which were positive.  She is still getting some tingling paresthesias on her left cheek.  She gets occasional palpitations.  She denies any chest pains or shortness of breath.  She is bicycle riding and walking more than she normally does and has no symptoms during those activities.\par 2020: Patient still gets occasional chest pains but they have not changed in pattern or severity.  She denies any shortness of breath but does get palpitations.  She was having pain in front of her ears and went to an ear nose and throat doctor who said she was grinding her teeth.  She will start to use her bite plate again.  He also did an indirect laryngoscopy and said that she had some evidence of reflux.  He directed her to come to her internist and ask about treatment.\par 2021: The patient has been getting chest pressure.  He can last an hour and a half.  It can happen just sitting at her computer.  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  She got her vaccines for COVID-19.  She denies any chest pains, shortness of breath, or palpitations.  She has not had a chance to do the stress test or echo because of scheduling.  She is still taking famotidine for her reflux disease and it is working well.\par 2021: The patient feels well.  She is now teaching second grade.  She got both of her COVID-19 vaccinations.  She denies any palpitations, chest pains, or shortness of breath.\par The patient is status post 2 full-term pregnancies, 2 C- sections, and she has 2 living children.\par 2022: 3 weeks ago the patient got COVID.  She now notices that she gets dyspnea on exertion when doing normal things.  She denies any shortness of breath at rest, chest pains, but does get occasional palpitations when worrying about things\par May 19, 2022: The patient has a headache today that is on the right side of her head going posteriorly.  She has had sinus problems and allergies.  She has been taking Claritin she also gets some chest pressure that lasts up to 1 minute usually few seconds.  She denies any chest pains, shortness of breath, but does get occasional palpitations.\par \par Patient never smoked and only has rare alcoholic beverages. She has 1-2 cups of caffeinated coffee a day but she's been recently trying to cut down.\par \par Mother has prediabetes and has been recently diagnosed with some hypertension. Her father  with end-stage kidney disease.\par \par She takes levothyroxine, Claritin PRN , and when she remembers vitamin D.\par \par She has no known drug allergies.\par \par She has been getting atypical chest pains, and palpitations, but no shortness of breath.

## 2022-06-12 ENCOUNTER — NON-APPOINTMENT (OUTPATIENT)
Age: 47
End: 2022-06-12

## 2022-06-21 ENCOUNTER — APPOINTMENT (OUTPATIENT)
Dept: MRI IMAGING | Facility: CLINIC | Age: 47
End: 2022-06-21
Payer: COMMERCIAL

## 2022-06-21 ENCOUNTER — OUTPATIENT (OUTPATIENT)
Dept: OUTPATIENT SERVICES | Facility: HOSPITAL | Age: 47
LOS: 1 days | End: 2022-06-21
Payer: COMMERCIAL

## 2022-06-21 DIAGNOSIS — Z00.8 ENCOUNTER FOR OTHER GENERAL EXAMINATION: ICD-10-CM

## 2022-06-21 DIAGNOSIS — H90.41 SENSORINEURAL HEARING LOSS, UNILATERAL, RIGHT EAR, WITH UNRESTRICTED HEARING ON THE CONTRALATERAL SIDE: ICD-10-CM

## 2022-06-21 PROCEDURE — 70553 MRI BRAIN STEM W/O & W/DYE: CPT

## 2022-06-21 PROCEDURE — 70553 MRI BRAIN STEM W/O & W/DYE: CPT | Mod: 26

## 2022-06-21 PROCEDURE — A9585: CPT

## 2022-06-28 ENCOUNTER — RESULT REVIEW (OUTPATIENT)
Age: 47
End: 2022-06-28

## 2022-06-28 ENCOUNTER — APPOINTMENT (OUTPATIENT)
Dept: ULTRASOUND IMAGING | Facility: CLINIC | Age: 47
End: 2022-06-28

## 2022-06-28 ENCOUNTER — APPOINTMENT (OUTPATIENT)
Dept: MAMMOGRAPHY | Facility: CLINIC | Age: 47
End: 2022-06-28

## 2022-06-28 PROCEDURE — 76641 ULTRASOUND BREAST COMPLETE: CPT | Mod: 50

## 2022-06-28 PROCEDURE — 77067 SCR MAMMO BI INCL CAD: CPT

## 2022-06-28 PROCEDURE — 76830 TRANSVAGINAL US NON-OB: CPT

## 2022-06-28 PROCEDURE — 76856 US EXAM PELVIC COMPLETE: CPT

## 2022-06-28 PROCEDURE — 77063 BREAST TOMOSYNTHESIS BI: CPT

## 2022-07-05 ENCOUNTER — APPOINTMENT (OUTPATIENT)
Dept: ULTRASOUND IMAGING | Facility: CLINIC | Age: 47
End: 2022-07-05

## 2022-07-05 PROCEDURE — 76642 ULTRASOUND BREAST LIMITED: CPT | Mod: RT

## 2022-07-06 ENCOUNTER — RESULT REVIEW (OUTPATIENT)
Age: 47
End: 2022-07-06

## 2022-07-07 ENCOUNTER — NON-APPOINTMENT (OUTPATIENT)
Age: 47
End: 2022-07-07

## 2022-07-09 ENCOUNTER — OUTPATIENT (OUTPATIENT)
Dept: OUTPATIENT SERVICES | Facility: HOSPITAL | Age: 47
LOS: 1 days | End: 2022-07-09
Payer: COMMERCIAL

## 2022-07-09 ENCOUNTER — RESULT REVIEW (OUTPATIENT)
Age: 47
End: 2022-07-09

## 2022-07-09 ENCOUNTER — APPOINTMENT (OUTPATIENT)
Dept: ULTRASOUND IMAGING | Facility: IMAGING CENTER | Age: 47
End: 2022-07-09

## 2022-07-09 DIAGNOSIS — N64.59 OTHER SIGNS AND SYMPTOMS IN BREAST: ICD-10-CM

## 2022-07-09 DIAGNOSIS — Z87.42 PERSONAL HISTORY OF OTHER DISEASES OF THE FEMALE GENITAL TRACT: ICD-10-CM

## 2022-07-09 DIAGNOSIS — Z00.8 ENCOUNTER FOR OTHER GENERAL EXAMINATION: ICD-10-CM

## 2022-07-09 DIAGNOSIS — Z00.00 ENCOUNTER FOR GENERAL ADULT MEDICAL EXAMINATION WITHOUT ABNORMAL FINDINGS: ICD-10-CM

## 2022-07-09 PROCEDURE — 19083 BX BREAST 1ST LESION US IMAG: CPT

## 2022-07-09 PROCEDURE — 88305 TISSUE EXAM BY PATHOLOGIST: CPT

## 2022-07-09 PROCEDURE — A4648: CPT

## 2022-07-09 PROCEDURE — 88305 TISSUE EXAM BY PATHOLOGIST: CPT | Mod: 26

## 2022-07-09 PROCEDURE — 77065 DX MAMMO INCL CAD UNI: CPT | Mod: 26,RT

## 2022-07-09 PROCEDURE — 19083 BX BREAST 1ST LESION US IMAG: CPT | Mod: RT

## 2022-07-09 PROCEDURE — 77065 DX MAMMO INCL CAD UNI: CPT

## 2022-07-09 PROCEDURE — 19084 BX BREAST ADD LESION US IMAG: CPT | Mod: RT

## 2022-07-09 PROCEDURE — 19084 BX BREAST ADD LESION US IMAG: CPT

## 2022-09-15 ENCOUNTER — NON-APPOINTMENT (OUTPATIENT)
Age: 47
End: 2022-09-15

## 2022-09-15 ENCOUNTER — APPOINTMENT (OUTPATIENT)
Dept: CARDIOLOGY | Facility: CLINIC | Age: 47
End: 2022-09-15

## 2022-09-15 VITALS
DIASTOLIC BLOOD PRESSURE: 88 MMHG | HEIGHT: 65 IN | WEIGHT: 235 LBS | BODY MASS INDEX: 39.15 KG/M2 | SYSTOLIC BLOOD PRESSURE: 129 MMHG | OXYGEN SATURATION: 100 % | HEART RATE: 67 BPM

## 2022-09-15 PROCEDURE — 99214 OFFICE O/P EST MOD 30 MIN: CPT | Mod: 25

## 2022-09-15 PROCEDURE — 93000 ELECTROCARDIOGRAM COMPLETE: CPT

## 2022-09-15 NOTE — DISCUSSION/SUMMARY
[FreeTextEntry1] : The patient was examined. Her blood pressure was 129/88 ,and  her pulse was 67. Her lungs were clear to auscultation. Cardiac exam was  negative for murmurs rubs or gallops. Her EKG showed normal sinus rhythm and no acute changes. . .. She will return in approximately 4  months ,or earlier if needed. She'll continue her current medication.   Total time spent on the day of the encounter was 32 minutes which includes  face-to-face and non face-to-face times personally spent by the physician preparing to see the patient, obtaining  separately obtained history, performing a medically appropriate exam and evaluation, counseling, educating, talking to the family or caregivers, ordering medicines, ordering tests or procedures, referring and communicating with other healthcare  professionals, and documenting clinical information in the electronic health record.  [EKG obtained to assist in diagnosis and management of assessed problem(s)] : EKG obtained to assist in diagnosis and management of assessed problem(s)

## 2022-09-15 NOTE — REASON FOR VISIT
[FreeTextEntry1] : This is the 13 th  office visit for this 47 year-old white female who comes in with a history of left cheek tingling paresthesias as well as tingling paresthesias of her left hand. She has had an EKG and a 30 day monitor. The patient states that in the past 4 months she has had no further chest pain and the numbness is very fleeting lasting just seconds and has happened only a couple of times in the last 4 months .Recently she was walking backwards and tripped over her child's bicycle and hit her coccyx and her head. She went to the emergency room and he did a CAT scan which was negative. She does have an appointment to followup with her neurologist within a week.\par 2019: The patient gets short-lived left-sided anterior chest discomforts that feel like a pressure on the last less than a minute. Not specifically related to activity. She is no shortness of breath but occasionally she feels her heart rate speed up.\par  2019: The patient's chest pains are less frequent. When they have been there only several seconds. Sometimes when she awakens After having fallen asleep, she feels her heart beating stronger. Her echocardiogram on 2019 showed mild to moderate mitral regurgitation. She takes no medications except for Synthroid and an occasional vitamin D.\par 2020: Patient gets headaches sometimes in the back of her head sometimes in the top of her head.  She has seen a neurologist about this.  Sometimes she gets some chest pressure on her left anterior chest but it only lasts for seconds at a time.  She denies any shortness of breath but does get occasional palpitations.\par 2020: Patient had a 2-week period where she lost her sense of smell and taste back in 2020.  She just went to her gynecologist and had a blood test including COVID-19 antibodies which were positive.  She is still getting some tingling paresthesias on her left cheek.  She gets occasional palpitations.  She denies any chest pains or shortness of breath.  She is bicycle riding and walking more than she normally does and has no symptoms during those activities.\par 2020: Patient still gets occasional chest pains but they have not changed in pattern or severity.  She denies any shortness of breath but does get palpitations.  She was having pain in front of her ears and went to an ear nose and throat doctor who said she was grinding her teeth.  She will start to use her bite plate again.  He also did an indirect laryngoscopy and said that she had some evidence of reflux.  He directed her to come to her internist and ask about treatment.\par 2021: The patient has been getting chest pressure.  He can last an hour and a half.  It can happen just sitting at her computer.  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  She got her vaccines for COVID-19.  She denies any chest pains, shortness of breath, or palpitations.  She has not had a chance to do the stress test or echo because of scheduling.  She is still taking famotidine for her reflux disease and it is working well.\par 2021: The patient feels well.  She is now teaching second grade.  She got both of her COVID-19 vaccinations.  She denies any palpitations, chest pains, or shortness of breath.\par The patient is status post 2 full-term pregnancies, 2 C- sections, and she has 2 living children.\par 2022: 3 weeks ago the patient got COVID.  She now notices that she gets dyspnea on exertion when doing normal things.  She denies any shortness of breath at rest, chest pains, but does get occasional palpitations when worrying about things\par May 19, 2022: The patient has a headache today that is on the right side of her head going posteriorly.  She has had sinus problems and allergies.  She has been taking Claritin she also gets some chest pressure that lasts up to 1 minute usually few seconds.  She denies any chest pains, shortness of breath, but does get occasional palpitations.\par September 15, 2022: The patient feels well.  She denies any shortness of breath, or chest pains.  She does get occasional palpitations but they are pretty rare.\par \par Patient never smoked and only has rare alcoholic beverages. She has 1-2 cups of caffeinated coffee a day but she's been recently trying to cut down.\par \par Mother has prediabetes and has been recently diagnosed with some hypertension. Her father  with end-stage kidney disease.\par \par She takes levothyroxine, Claritin PRN , and when she remembers vitamin D.\par \par She has no known drug allergies.\par \par She has been getting atypical chest pains, and palpitations, but no shortness of breath.

## 2022-09-15 NOTE — REVIEW OF SYSTEMS
[Dyspnea on exertion] : dyspnea during exertion [Chest Discomfort] : chest discomfort [Palpitations] : palpitations [Heartburn] : heartburn [Rash] : rash [Itching] : itching [Negative] : Heme/Lymph [SOB] : no shortness of breath [Lower Ext Edema] : no extremity edema [Leg Claudication] : no intermittent leg claudication [Orthopnea] : no orthopnea [PND] : no PND [Syncope] : no syncope

## 2022-11-05 ENCOUNTER — NON-APPOINTMENT (OUTPATIENT)
Age: 47
End: 2022-11-05

## 2022-11-06 ENCOUNTER — EMERGENCY (EMERGENCY)
Facility: HOSPITAL | Age: 47
LOS: 1 days | Discharge: HOME | End: 2022-11-06
Attending: EMERGENCY MEDICINE | Admitting: EMERGENCY MEDICINE
Payer: COMMERCIAL

## 2022-11-06 VITALS
RESPIRATION RATE: 16 BRPM | OXYGEN SATURATION: 99 % | SYSTOLIC BLOOD PRESSURE: 128 MMHG | HEART RATE: 73 BPM | DIASTOLIC BLOOD PRESSURE: 73 MMHG

## 2022-11-06 VITALS
OXYGEN SATURATION: 99 % | WEIGHT: 229.94 LBS | TEMPERATURE: 98 F | DIASTOLIC BLOOD PRESSURE: 100 MMHG | RESPIRATION RATE: 18 BRPM | HEIGHT: 66 IN | SYSTOLIC BLOOD PRESSURE: 169 MMHG | HEART RATE: 97 BPM

## 2022-11-06 LAB
ALBUMIN SERPL ELPH-MCNC: 4.3 G/DL — SIGNIFICANT CHANGE UP (ref 3.3–5)
ALP SERPL-CCNC: 71 U/L — SIGNIFICANT CHANGE UP (ref 40–120)
ALT FLD-CCNC: 31 U/L — SIGNIFICANT CHANGE UP (ref 10–45)
ANION GAP SERPL CALC-SCNC: 9 MMOL/L — SIGNIFICANT CHANGE UP (ref 5–17)
AST SERPL-CCNC: 19 U/L — SIGNIFICANT CHANGE UP (ref 10–40)
BASOPHILS # BLD AUTO: 0.09 K/UL — SIGNIFICANT CHANGE UP (ref 0–0.2)
BASOPHILS NFR BLD AUTO: 0.8 % — SIGNIFICANT CHANGE UP (ref 0–2)
BILIRUB SERPL-MCNC: 1.4 MG/DL — HIGH (ref 0.2–1.2)
BUN SERPL-MCNC: 17 MG/DL — SIGNIFICANT CHANGE UP (ref 7–23)
CALCIUM SERPL-MCNC: 9.2 MG/DL — SIGNIFICANT CHANGE UP (ref 8.4–10.5)
CHLORIDE SERPL-SCNC: 101 MMOL/L — SIGNIFICANT CHANGE UP (ref 96–108)
CK MB CFR SERPL CALC: 1.1 NG/ML — SIGNIFICANT CHANGE UP (ref 0.5–10)
CO2 SERPL-SCNC: 29 MMOL/L — SIGNIFICANT CHANGE UP (ref 22–31)
CREAT SERPL-MCNC: 0.66 MG/DL — SIGNIFICANT CHANGE UP (ref 0.5–1.3)
D DIMER BLD IA.RAPID-MCNC: <150 NG/ML DDU — SIGNIFICANT CHANGE UP
EGFR: 108 ML/MIN/1.73M2 — SIGNIFICANT CHANGE UP
EOSINOPHIL # BLD AUTO: 0.37 K/UL — SIGNIFICANT CHANGE UP (ref 0–0.5)
EOSINOPHIL NFR BLD AUTO: 3.2 % — SIGNIFICANT CHANGE UP (ref 0–6)
GLUCOSE SERPL-MCNC: 86 MG/DL — SIGNIFICANT CHANGE UP (ref 70–99)
HCT VFR BLD CALC: 36.8 % — SIGNIFICANT CHANGE UP (ref 34.5–45)
HGB BLD-MCNC: 11.3 G/DL — LOW (ref 11.5–15.5)
IMM GRANULOCYTES NFR BLD AUTO: 0.3 % — SIGNIFICANT CHANGE UP (ref 0–0.9)
LIDOCAIN IGE QN: 92 U/L — SIGNIFICANT CHANGE UP (ref 73–393)
LYMPHOCYTES # BLD AUTO: 3.75 K/UL — HIGH (ref 1–3.3)
LYMPHOCYTES # BLD AUTO: 32.2 % — SIGNIFICANT CHANGE UP (ref 13–44)
MAGNESIUM SERPL-MCNC: 1.7 MG/DL — SIGNIFICANT CHANGE UP (ref 1.6–2.6)
MCHC RBC-ENTMCNC: 20.1 PG — LOW (ref 27–34)
MCHC RBC-ENTMCNC: 30.7 GM/DL — LOW (ref 32–36)
MCV RBC AUTO: 65.6 FL — LOW (ref 80–100)
MONOCYTES # BLD AUTO: 0.76 K/UL — SIGNIFICANT CHANGE UP (ref 0–0.9)
MONOCYTES NFR BLD AUTO: 6.5 % — SIGNIFICANT CHANGE UP (ref 2–14)
NEUTROPHILS # BLD AUTO: 6.63 K/UL — SIGNIFICANT CHANGE UP (ref 1.8–7.4)
NEUTROPHILS NFR BLD AUTO: 57 % — SIGNIFICANT CHANGE UP (ref 43–77)
NRBC # BLD: 0 /100 WBCS — SIGNIFICANT CHANGE UP (ref 0–0)
NT-PROBNP SERPL-SCNC: 36 PG/ML — SIGNIFICANT CHANGE UP (ref 0–300)
PLATELET # BLD AUTO: 388 K/UL — SIGNIFICANT CHANGE UP (ref 150–400)
POTASSIUM SERPL-MCNC: 3.8 MMOL/L — SIGNIFICANT CHANGE UP (ref 3.5–5.3)
POTASSIUM SERPL-SCNC: 3.8 MMOL/L — SIGNIFICANT CHANGE UP (ref 3.5–5.3)
PROT SERPL-MCNC: 7.9 G/DL — SIGNIFICANT CHANGE UP (ref 6–8.3)
RAPID RVP RESULT: SIGNIFICANT CHANGE UP
RBC # BLD: 5.61 M/UL — HIGH (ref 3.8–5.2)
RBC # FLD: 16.6 % — HIGH (ref 10.3–14.5)
SARS-COV-2 RNA SPEC QL NAA+PROBE: SIGNIFICANT CHANGE UP
SODIUM SERPL-SCNC: 139 MMOL/L — SIGNIFICANT CHANGE UP (ref 135–145)
TROPONIN I, HIGH SENSITIVITY RESULT: 4.9 NG/L — SIGNIFICANT CHANGE UP
WBC # BLD: 11.63 K/UL — HIGH (ref 3.8–10.5)
WBC # FLD AUTO: 11.63 K/UL — HIGH (ref 3.8–10.5)

## 2022-11-06 PROCEDURE — 96361 HYDRATE IV INFUSION ADD-ON: CPT

## 2022-11-06 PROCEDURE — 82553 CREATINE MB FRACTION: CPT

## 2022-11-06 PROCEDURE — 84484 ASSAY OF TROPONIN QUANT: CPT

## 2022-11-06 PROCEDURE — 83880 ASSAY OF NATRIURETIC PEPTIDE: CPT

## 2022-11-06 PROCEDURE — 80053 COMPREHEN METABOLIC PANEL: CPT

## 2022-11-06 PROCEDURE — 36415 COLL VENOUS BLD VENIPUNCTURE: CPT

## 2022-11-06 PROCEDURE — 99285 EMERGENCY DEPT VISIT HI MDM: CPT

## 2022-11-06 PROCEDURE — 93005 ELECTROCARDIOGRAM TRACING: CPT

## 2022-11-06 PROCEDURE — 71045 X-RAY EXAM CHEST 1 VIEW: CPT

## 2022-11-06 PROCEDURE — 85379 FIBRIN DEGRADATION QUANT: CPT

## 2022-11-06 PROCEDURE — 83735 ASSAY OF MAGNESIUM: CPT

## 2022-11-06 PROCEDURE — 71045 X-RAY EXAM CHEST 1 VIEW: CPT | Mod: 26

## 2022-11-06 PROCEDURE — 93010 ELECTROCARDIOGRAM REPORT: CPT

## 2022-11-06 PROCEDURE — 83690 ASSAY OF LIPASE: CPT

## 2022-11-06 PROCEDURE — 99285 EMERGENCY DEPT VISIT HI MDM: CPT | Mod: 25

## 2022-11-06 PROCEDURE — 96374 THER/PROPH/DIAG INJ IV PUSH: CPT

## 2022-11-06 PROCEDURE — 85025 COMPLETE CBC W/AUTO DIFF WBC: CPT

## 2022-11-06 PROCEDURE — 0225U NFCT DS DNA&RNA 21 SARSCOV2: CPT

## 2022-11-06 RX ORDER — FAMOTIDINE 10 MG/ML
20 INJECTION INTRAVENOUS ONCE
Refills: 0 | Status: COMPLETED | OUTPATIENT
Start: 2022-11-06 | End: 2022-11-06

## 2022-11-06 RX ORDER — SODIUM CHLORIDE 9 MG/ML
1000 INJECTION INTRAMUSCULAR; INTRAVENOUS; SUBCUTANEOUS ONCE
Refills: 0 | Status: COMPLETED | OUTPATIENT
Start: 2022-11-06 | End: 2022-11-06

## 2022-11-06 RX ORDER — FAMOTIDINE 10 MG/ML
1 INJECTION INTRAVENOUS
Qty: 10 | Refills: 0
Start: 2022-11-06 | End: 2022-11-10

## 2022-11-06 RX ADMIN — SODIUM CHLORIDE 1000 MILLILITER(S): 9 INJECTION INTRAMUSCULAR; INTRAVENOUS; SUBCUTANEOUS at 14:53

## 2022-11-06 RX ADMIN — Medication 30 MILLILITER(S): at 14:53

## 2022-11-06 RX ADMIN — FAMOTIDINE 200 MILLIGRAM(S): 10 INJECTION INTRAVENOUS at 14:53

## 2022-11-06 NOTE — ED PROVIDER NOTE - ATTENDING APP SHARED VISIT CONTRIBUTION OF CARE
PA HPI " 48 yo f with long standing hx of intermittent cp and palpitations without diagnosis and follows with cardiologist. pt was sent in by urgent care to have D-Dimer done. Pt c/o chest pain radiating to upper back and shoulder blade x 2 weeks intermittently. minimal pain currently denies rash, abd pain, diarrhea, n/v, headache, vision changes, SOB, cough, fever, chills, sore throat, difficulty tolerating PO  no recent travel, no covid + contacts"   PE as documented   DATA:  EKG: Normal NSR@69  RESULTS: All significant/relevant labs and imaging results present during the time of evaluation have been entered into chart through pullset function and are discussed below    MDM:  IMPRESSION: probable GERD as etiology of chest pain will need w/u as outpt    PLAN  Dc with followup  RTED PRN  Reassess

## 2022-11-06 NOTE — ED PROVIDER NOTE - PATIENT PORTAL LINK FT
You can access the FollowMyHealth Patient Portal offered by [facility] by registering at the following website: [email or web address]. By joining [employer]’s FollowMyHealth portal, you will also be able to view your health information using other applications (apps) compatible with our system.

## 2022-11-06 NOTE — ED ADULT NURSE NOTE - OBJECTIVE STATEMENT
intermittent chest pain for 2 weeks, at different times and different presentation. Skin warm dry color pink. No edema.

## 2022-11-06 NOTE — ED ADULT TRIAGE NOTE - CHIEF COMPLAINT QUOTE
PT sent in by urgent care to have D-Dimer done. Pt c/o chest pain radiating to upper back and shoulder blade x 2 weeks intermittently.

## 2022-11-06 NOTE — ED PROVIDER NOTE - CLINICAL SUMMARY MEDICAL DECISION MAKING FREE TEXT BOX
pt 48 yo f with long standing hx of intermittent cp and palpitations without diagnosis and follows with cardiologist. pt was sent in by urgent care to have D-Dimer done. Pt c/o chest pain radiating to upper back and shoulder blade x 2 weeks intermittently. minimal pain currently  denies rash, abd pain, diarrhea, n/v, headache, vision changes, SOB, cough, fever, chills, sore throat, difficulty tolerating PO  no recent travel, no covid + contacts   unremarkable exam. labs and reassess pt 48 yo f with long standing hx of intermittent cp and palpitations without diagnosis and follows with cardiologist. pt was sent in by urgent care to have D-Dimer done. Pt c/o chest pain radiating to upper back and shoulder blade x 2 weeks intermittently. minimal pain currently  denies rash, abd pain, diarrhea, n/v, headache, vision changes, SOB, cough, fever, chills, sore throat, difficulty tolerating PO  no recent travel, no covid + contacts   unremarkable exam. labs and reassess  pt feeling better with pepcid  Discussed with patient need to return to ED if symptoms don't continue to improve or recur or develops any new or worsening symptoms that are of concern.

## 2022-11-06 NOTE — ED PROVIDER NOTE - OBJECTIVE STATEMENT
pt 48 yo f with long standing hx of intermittent cp and palpitations without diagnosis and follows with cardiologist. pt was sent in by urgent care to have D-Dimer done. Pt c/o chest pain radiating to upper back and shoulder blade x 2 weeks intermittently. minimal pain currently  denies rash, abd pain, diarrhea, n/v, headache, vision changes, SOB, cough, fever, chills, sore throat, difficulty tolerating PO  no recent travel, no covid + contacts

## 2023-01-06 ENCOUNTER — RESULT REVIEW (OUTPATIENT)
Age: 48
End: 2023-01-06

## 2023-01-09 ENCOUNTER — APPOINTMENT (OUTPATIENT)
Dept: ULTRASOUND IMAGING | Facility: CLINIC | Age: 48
End: 2023-01-09
Payer: COMMERCIAL

## 2023-01-09 ENCOUNTER — RESULT REVIEW (OUTPATIENT)
Age: 48
End: 2023-01-09

## 2023-01-09 PROCEDURE — 76642 ULTRASOUND BREAST LIMITED: CPT | Mod: RT

## 2023-01-22 ENCOUNTER — NON-APPOINTMENT (OUTPATIENT)
Age: 48
End: 2023-01-22

## 2023-01-23 ENCOUNTER — NON-APPOINTMENT (OUTPATIENT)
Age: 48
End: 2023-01-23

## 2023-02-09 ENCOUNTER — NON-APPOINTMENT (OUTPATIENT)
Age: 48
End: 2023-02-09

## 2023-02-09 ENCOUNTER — APPOINTMENT (OUTPATIENT)
Dept: CARDIOLOGY | Facility: CLINIC | Age: 48
End: 2023-02-09
Payer: COMMERCIAL

## 2023-02-09 VITALS
OXYGEN SATURATION: 95 % | HEIGHT: 65 IN | BODY MASS INDEX: 39.32 KG/M2 | WEIGHT: 236 LBS | HEART RATE: 76 BPM | SYSTOLIC BLOOD PRESSURE: 138 MMHG | DIASTOLIC BLOOD PRESSURE: 90 MMHG

## 2023-02-09 DIAGNOSIS — R53.83 OTHER FATIGUE: ICD-10-CM

## 2023-02-09 PROCEDURE — 99214 OFFICE O/P EST MOD 30 MIN: CPT | Mod: 25

## 2023-02-09 PROCEDURE — 93000 ELECTROCARDIOGRAM COMPLETE: CPT

## 2023-02-09 NOTE — REVIEW OF SYSTEMS
[Dyspnea on exertion] : dyspnea during exertion [Chest Discomfort] : chest discomfort [Palpitations] : palpitations [Heartburn] : heartburn [Rash] : rash [Itching] : itching [Negative] : Heme/Lymph [Feeling Fatigued] : feeling fatigued [SOB] : no shortness of breath [Lower Ext Edema] : no extremity edema [Leg Claudication] : no intermittent leg claudication [Orthopnea] : no orthopnea [PND] : no PND [Syncope] : no syncope

## 2023-02-09 NOTE — DISCUSSION/SUMMARY
[EKG obtained to assist in diagnosis and management of assessed problem(s)] : EKG obtained to assist in diagnosis and management of assessed problem(s) [FreeTextEntry1] : The patient was examined. Her blood pressure was 138/90 ,and  her pulse was 76. Her lungs were clear to auscultation. Cardiac exam was  negative for murmurs rubs or gallops. Her EKG showed normal sinus rhythm and no acute changes.  She will return in approximately 4  months ,or earlier if needed. She'll continue her current medication. Total time spent on the day of the encounter was 31 minutes which includes  face-to-face and non face-to-face times personally spent by the physician preparing to see the patient, obtaining  separately obtained history, performing a medically appropriate exam and evaluation, counseling, educating, talking to the family or caregivers, ordering medicines, ordering tests or procedures, referring and communicating with other healthcare  professionals, and documenting clinical information in the electronic health record.

## 2023-02-09 NOTE — REASON FOR VISIT
[FreeTextEntry1] : This is the 14 th  office visit for this 47 year-old white female who comes in with a history of left cheek tingling paresthesias as well as tingling paresthesias of her left hand. She has had an EKG and a 30 day monitor. The patient states that in the past 4 months she has had no further chest pain and the numbness is very fleeting lasting just seconds and has happened only a couple of times in the last 4 months .Recently she was walking backwards and tripped over her child's bicycle and hit her coccyx and her head. She went to the emergency room and he did a CAT scan which was negative. She does have an appointment to followup with her neurologist within a week.\par 2019: The patient gets short-lived left-sided anterior chest discomforts that feel like a pressure on the last less than a minute. Not specifically related to activity. She is no shortness of breath but occasionally she feels her heart rate speed up.\par  2019: The patient's chest pains are less frequent. When they have been there only several seconds. Sometimes when she awakens After having fallen asleep, she feels her heart beating stronger. Her echocardiogram on 2019 showed mild to moderate mitral regurgitation. She takes no medications except for Synthroid and an occasional vitamin D.\par 2020: Patient gets headaches sometimes in the back of her head sometimes in the top of her head.  She has seen a neurologist about this.  Sometimes she gets some chest pressure on her left anterior chest but it only lasts for seconds at a time.  She denies any shortness of breath but does get occasional palpitations.\par 2020: Patient had a 2-week period where she lost her sense of smell and taste back in 2020.  She just went to her gynecologist and had a blood test including COVID-19 antibodies which were positive.  She is still getting some tingling paresthesias on her left cheek.  She gets occasional palpitations.  She denies any chest pains or shortness of breath.  She is bicycle riding and walking more than she normally does and has no symptoms during those activities.\par 2020: Patient still gets occasional chest pains but they have not changed in pattern or severity.  She denies any shortness of breath but does get palpitations.  She was having pain in front of her ears and went to an ear nose and throat doctor who said she was grinding her teeth.  She will start to use her bite plate again.  He also did an indirect laryngoscopy and said that she had some evidence of reflux.  He directed her to come to her internist and ask about treatment.\par 2021: The patient has been getting chest pressure.  He can last an hour and a half.  It can happen just sitting at her computer.  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  She got her vaccines for COVID-19.  She denies any chest pains, shortness of breath, or palpitations.  She has not had a chance to do the stress test or echo because of scheduling.  She is still taking famotidine for her reflux disease and it is working well.\par 2021: The patient feels well.  She is now teaching second grade.  She got both of her COVID-19 vaccinations.  She denies any palpitations, chest pains, or shortness of breath.\par The patient is status post 2 full-term pregnancies, 2 C- sections, and she has 2 living children.\par 2022: 3 weeks ago the patient got COVID.  She now notices that she gets dyspnea on exertion when doing normal things.  She denies any shortness of breath at rest, chest pains, but does get occasional palpitations when worrying about things\par May 19, 2022: The patient has a headache today that is on the right side of her head going posteriorly.  She has had sinus problems and allergies.  She has been taking Claritin she also gets some chest pressure that lasts up to 1 minute usually few seconds.  She denies any chest pains, shortness of breath, but does get occasional palpitations.\par September 15, 2022: The patient feels well.  She denies any shortness of breath, or chest pains.  She does get occasional palpitations but they are pretty rare.\par 2023: The patient had a COVID infection which was her second time in 2023.  She is left with fatigue.  She denies any chest pains, shortness of breath, or palpitations.\par \par Patient never smoked and only has rare alcoholic beverages. She has 1-2 cups of caffeinated coffee a day but she's been recently trying to cut down.\par \par Mother has prediabetes and has been recently diagnosed with some hypertension. Her father  with end-stage kidney disease.\par \par She takes levothyroxine, Claritin PRN , and when she remembers vitamin D.\par \par She has no known drug allergies.\par \par She has been getting atypical chest pains, and palpitations, but no shortness of breath.

## 2023-06-02 ENCOUNTER — APPOINTMENT (OUTPATIENT)
Dept: OBGYN | Facility: CLINIC | Age: 48
End: 2023-06-02

## 2023-06-06 ENCOUNTER — APPOINTMENT (OUTPATIENT)
Dept: OBGYN | Facility: CLINIC | Age: 48
End: 2023-06-06
Payer: COMMERCIAL

## 2023-06-06 VITALS
SYSTOLIC BLOOD PRESSURE: 157 MMHG | HEIGHT: 65 IN | BODY MASS INDEX: 39.68 KG/M2 | HEART RATE: 73 BPM | DIASTOLIC BLOOD PRESSURE: 87 MMHG | WEIGHT: 238.19 LBS

## 2023-06-06 DIAGNOSIS — Z01.411 ENCOUNTER FOR GYNECOLOGICAL EXAMINATION (GENERAL) (ROUTINE) WITH ABNORMAL FINDINGS: ICD-10-CM

## 2023-06-06 DIAGNOSIS — N64.59 OTHER SIGNS AND SYMPTOMS IN BREAST: ICD-10-CM

## 2023-06-06 PROCEDURE — 99396 PREV VISIT EST AGE 40-64: CPT

## 2023-06-06 NOTE — HISTORY OF PRESENT ILLNESS
[FreeTextEntry1] : 48 y.o. P   LNMP , presents for annual exam. , PMH hypothyroidism,  on Synthroid 150mcg,  Endocrinologist is Gertrudis Vu M.D.,Benign breast disease  , PSHx - total thyroidectomy, right breast biopsy -   FH - HTN - mother, Diabetes - brother,  Mental Illness- father, ( ). SH - negative, OB hx  C/S x 2.  GYN hx  - Ovarian cystectomy - Dermoid.  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 2022 - WNL\par pt is due for baseline colorectal screening, \par pt is due for mammo/breast sono. \par today, pt is c/o RLQ pain ,  x 2 days , which remitted spontaneously.

## 2023-06-06 NOTE — DISCUSSION/SUMMARY
[FreeTextEntry1] : A - WWV\par     obesity\par     Hypothyroidism\par     RLQ pain\par     benign breast disease\par \par P- f/u for results\par     exercise encouraged\par     nutritional counseling provided, \par     pap next due in 2025\par     referral for colorectal screening  given\par     referral for mammo/ breast sono given\par     referral for pelvic sono given \par    Urine C&S done,

## 2023-06-08 ENCOUNTER — NON-APPOINTMENT (OUTPATIENT)
Age: 48
End: 2023-06-08

## 2023-06-08 ENCOUNTER — APPOINTMENT (OUTPATIENT)
Dept: CARDIOLOGY | Facility: CLINIC | Age: 48
End: 2023-06-08
Payer: COMMERCIAL

## 2023-06-08 VITALS
SYSTOLIC BLOOD PRESSURE: 132 MMHG | WEIGHT: 235 LBS | OXYGEN SATURATION: 99 % | BODY MASS INDEX: 39.15 KG/M2 | HEIGHT: 65 IN | DIASTOLIC BLOOD PRESSURE: 90 MMHG | HEART RATE: 62 BPM

## 2023-06-08 DIAGNOSIS — R00.2 PALPITATIONS: ICD-10-CM

## 2023-06-08 PROCEDURE — 93000 ELECTROCARDIOGRAM COMPLETE: CPT

## 2023-06-08 PROCEDURE — 99214 OFFICE O/P EST MOD 30 MIN: CPT | Mod: 25

## 2023-06-08 NOTE — REASON FOR VISIT
[FreeTextEntry1] : This is the 14 th  office visit for this 47 year-old white female who comes in with a history of left cheek tingling paresthesias as well as tingling paresthesias of her left hand. She has had an EKG and a 30 day monitor. The patient states that in the past 4 months she has had no further chest pain and the numbness is very fleeting lasting just seconds and has happened only a couple of times in the last 4 months .Recently she was walking backwards and tripped over her child's bicycle and hit her coccyx and her head. She went to the emergency room and he did a CAT scan which was negative. She does have an appointment to followup with her neurologist within a week.\par 2019: The patient gets short-lived left-sided anterior chest discomforts that feel like a pressure on the last less than a minute. Not specifically related to activity. She is no shortness of breath but occasionally she feels her heart rate speed up.\par  2019: The patient's chest pains are less frequent. When they have been there only several seconds. Sometimes when she awakens After having fallen asleep, she feels her heart beating stronger. Her echocardiogram on 2019 showed mild to moderate mitral regurgitation. She takes no medications except for Synthroid and an occasional vitamin D.\par 2020: Patient gets headaches sometimes in the back of her head sometimes in the top of her head.  She has seen a neurologist about this.  Sometimes she gets some chest pressure on her left anterior chest but it only lasts for seconds at a time.  She denies any shortness of breath but does get occasional palpitations.\par 2020: Patient had a 2-week period where she lost her sense of smell and taste back in 2020.  She just went to her gynecologist and had a blood test including COVID-19 antibodies which were positive.  She is still getting some tingling paresthesias on her left cheek.  She gets occasional palpitations.  She denies any chest pains or shortness of breath.  She is bicycle riding and walking more than she normally does and has no symptoms during those activities.\par 2020: Patient still gets occasional chest pains but they have not changed in pattern or severity.  She denies any shortness of breath but does get palpitations.  She was having pain in front of her ears and went to an ear nose and throat doctor who said she was grinding her teeth.  She will start to use her bite plate again.  He also did an indirect laryngoscopy and said that she had some evidence of reflux.  He directed her to come to her internist and ask about treatment.\par 2021: The patient has been getting chest pressure.  He can last an hour and a half.  It can happen just sitting at her computer.  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  She got her vaccines for COVID-19.  She denies any chest pains, shortness of breath, or palpitations.  She has not had a chance to do the stress test or echo because of scheduling.  She is still taking famotidine for her reflux disease and it is working well.\par 2021: The patient feels well.  She is now teaching second grade.  She got both of her COVID-19 vaccinations.  She denies any palpitations, chest pains, or shortness of breath.\par The patient is status post 2 full-term pregnancies, 2 C- sections, and she has 2 living children.\par 2022: 3 weeks ago the patient got COVID.  She now notices that she gets dyspnea on exertion when doing normal things.  She denies any shortness of breath at rest, chest pains, but does get occasional palpitations when worrying about things\par May 19, 2022: The patient has a headache today that is on the right side of her head going posteriorly.  She has had sinus problems and allergies.  She has been taking Claritin she also gets some chest pressure that lasts up to 1 minute usually few seconds.  She denies any chest pains, shortness of breath, but does get occasional palpitations.\par September 15, 2022: The patient feels well.  She denies any shortness of breath, or chest pains.  She does get occasional palpitations but they are pretty rare.\par 2023: The patient had a COVID infection which was her second time in 2023.  She is left with fatigue.  She denies any chest pains, shortness of breath, or palpitations.\par 2023: Patient is having a day where she left her phone home and had to go back and get it and then hit traffic and was about 25 minutes late for this appointment.  So she has been stressed.  She denies any chest pains or shortness of breath at rest.  She does get dyspnea on exertion and occasional palpitations.  She has 1 cup and sometimes 2 cups of caffeinated coffee a day.  She is taking famotidine 20 mg as well as her levothyroxine.\par Patient never smoked and only has rare alcoholic beverages. She has 1-2 cups of caffeinated coffee a day but she's been recently trying to cut down.\par \par Mother has prediabetes and has been recently diagnosed with some hypertension. Her father  with end-stage kidney disease.\par \par She takes levothyroxine, Claritin PRN , and when she remembers vitamin D.\par \par She has no known drug allergies.\par \par She has been getting atypical chest pains, and palpitations, but no shortness of breath.

## 2023-06-08 NOTE — DISCUSSION/SUMMARY
[EKG obtained to assist in diagnosis and management of assessed problem(s)] : EKG obtained to assist in diagnosis and management of assessed problem(s) [FreeTextEntry1] : The patient was examined. Her blood pressure was 132/90 ,and  her pulse was 62. Her lungs were clear to auscultation. Cardiac exam was  negative for murmurs rubs or gallops. Her EKG showed normal sinus rhythm and no acute changes.  She will return in approximately 4  months ,or earlier if needed. She'll continue her current medication. Total time spent on the day of the encounter was 32 minutes which includes  face-to-face and non face-to-face times personally spent by the physician preparing to see the patient, obtaining  separately obtained history, performing a medically appropriate exam and evaluation, counseling, educating, talking to the family or caregivers, ordering medicines, ordering tests or procedures, referring and communicating with other healthcare  professionals, and documenting clinical information in the electronic health record.

## 2023-06-08 NOTE — REVIEW OF SYSTEMS
[Feeling Fatigued] : feeling fatigued [Dyspnea on exertion] : dyspnea during exertion [Palpitations] : palpitations [Heartburn] : heartburn [Rash] : rash [Itching] : itching [Negative] : Heme/Lymph [SOB] : no shortness of breath [Chest Discomfort] : no chest discomfort [Lower Ext Edema] : no extremity edema [Leg Claudication] : no intermittent leg claudication [Orthopnea] : no orthopnea [Syncope] : no syncope [PND] : no PND

## 2023-06-08 NOTE — PHYSICAL EXAM
[General Appearance - Well Developed] : well developed [Normal Appearance] : normal appearance [Well Groomed] : well groomed [No Deformities] : no deformities [General Appearance - In No Acute Distress] : no acute distress [Normal Conjunctiva] : the conjunctiva exhibited no abnormalities [Eyelids - No Xanthelasma] : the eyelids demonstrated no xanthelasmas [Normal Oral Mucosa] : normal oral mucosa [No Oral Pallor] : no oral pallor [No Oral Cyanosis] : no oral cyanosis [Normal Jugular Venous A Waves Present] : normal jugular venous A waves present [Normal Jugular Venous V Waves Present] : normal jugular venous V waves present [No Jugular Venous Castillo A Waves] : no jugular venous castillo A waves [Respiration, Rhythm And Depth] : normal respiratory rhythm and effort [Exaggerated Use Of Accessory Muscles For Inspiration] : no accessory muscle use [Auscultation Breath Sounds / Voice Sounds] : lungs were clear to auscultation bilaterally [Heart Rate And Rhythm] : heart rate and rhythm were normal [Heart Sounds] : normal S1 and S2 [Murmurs] : no murmurs present [Abdomen Soft] : soft [Abdomen Tenderness] : non-tender [Abdomen Mass (___ Cm)] : no abdominal mass palpated [Abnormal Walk] : normal gait [Gait - Sufficient For Exercise Testing] : the gait was sufficient for exercise testing [Nail Clubbing] : no clubbing of the fingernails [Cyanosis, Localized] : no localized cyanosis [Petechial Hemorrhages (___cm)] : no petechial hemorrhages [Skin Color & Pigmentation] : normal skin color and pigmentation [] : no rash [No Venous Stasis] : no venous stasis [Skin Lesions] : no skin lesions [No Skin Ulcers] : no skin ulcer [No Xanthoma] : no  xanthoma was observed [Oriented To Time, Place, And Person] : oriented to person, place, and time [Mood] : the mood was normal [Affect] : the affect was normal [No Anxiety] : not feeling anxious [FreeTextEntry1] : obese

## 2023-06-09 LAB — BACTERIA UR CULT: NORMAL

## 2023-07-05 ENCOUNTER — RESULT REVIEW (OUTPATIENT)
Age: 48
End: 2023-07-05

## 2023-07-05 ENCOUNTER — APPOINTMENT (OUTPATIENT)
Dept: ULTRASOUND IMAGING | Facility: CLINIC | Age: 48
End: 2023-07-05
Payer: COMMERCIAL

## 2023-07-05 ENCOUNTER — APPOINTMENT (OUTPATIENT)
Dept: MAMMOGRAPHY | Facility: CLINIC | Age: 48
End: 2023-07-05
Payer: COMMERCIAL

## 2023-07-05 PROCEDURE — 77067 SCR MAMMO BI INCL CAD: CPT

## 2023-07-05 PROCEDURE — 76830 TRANSVAGINAL US NON-OB: CPT

## 2023-07-05 PROCEDURE — 77063 BREAST TOMOSYNTHESIS BI: CPT

## 2023-07-05 PROCEDURE — 76641 ULTRASOUND BREAST COMPLETE: CPT | Mod: 50

## 2023-07-06 ENCOUNTER — NON-APPOINTMENT (OUTPATIENT)
Age: 48
End: 2023-07-06

## 2023-07-06 DIAGNOSIS — N84.0 POLYP OF CORPUS UTERI: ICD-10-CM

## 2023-07-10 ENCOUNTER — APPOINTMENT (OUTPATIENT)
Dept: ENDOCRINOLOGY | Facility: CLINIC | Age: 48
End: 2023-07-10
Payer: COMMERCIAL

## 2023-07-10 VITALS
HEART RATE: 78 BPM | HEIGHT: 65 IN | SYSTOLIC BLOOD PRESSURE: 160 MMHG | BODY MASS INDEX: 39.15 KG/M2 | WEIGHT: 235 LBS | OXYGEN SATURATION: 99 % | DIASTOLIC BLOOD PRESSURE: 84 MMHG

## 2023-07-10 PROCEDURE — 99214 OFFICE O/P EST MOD 30 MIN: CPT

## 2023-07-10 NOTE — ASSESSMENT
[FreeTextEntry1] : 48 year old woman with papillary thyroid cancer post thyroidectomy in 2011 and radioiodine with excellent response to tx, on levothyroxine, clinically euthyroid\par   - Check TFTs and Tg, adjust levothyroxine dose as needed, needs new script\par \par Obesity/prediabetes - Encouraged pt to continue with diet and increase exercise\par  - check hba1c\par  - Discussed GLP-1  She will consider\par \par Vitamin D def'y - cont vitamin D supplementation\par \par HTN - will repeat in 1-2 weeks and if still elevated start HCTZ 25 mg\par Counseled on limiting salt intake\par \par f/u one year and for BP check with RN in 2 weeks.  Has cardiology appt with new cardiologist scheduled and will be establishing care with new PMD.

## 2023-07-10 NOTE — HISTORY OF PRESENT ILLNESS
[FreeTextEntry1] : cc: thyroid cancer\par \par 48 year old woman with papillary thyroid cancer post thyroidectomy in 2011 and radioiodine tx in 2013.  Taking  levothyroxine 150 micrograms daily on an empty stomach, separate from food and vitamins. .  She has not noted any  changes in her neck, reports no dyspnea or dysphagia.  \par \par \par Prediabetes/obesity - Recently started Noom.  Finds that it is helpful in keeping her motivated, avoiding reacting to stress. Has been walking a lot.\par \par Vitamin D Def'y- taking supplemental vitamin D 2000 - 3000 units  a few times a week\par \par PMD retired, need to establish care with new PMD\par

## 2023-07-14 ENCOUNTER — APPOINTMENT (OUTPATIENT)
Dept: CARDIOLOGY | Facility: CLINIC | Age: 48
End: 2023-07-14
Payer: COMMERCIAL

## 2023-07-14 VITALS
WEIGHT: 230 LBS | OXYGEN SATURATION: 96 % | HEART RATE: 7 BPM | SYSTOLIC BLOOD PRESSURE: 150 MMHG | BODY MASS INDEX: 38.27 KG/M2 | DIASTOLIC BLOOD PRESSURE: 84 MMHG

## 2023-07-14 VITALS — SYSTOLIC BLOOD PRESSURE: 134 MMHG | DIASTOLIC BLOOD PRESSURE: 82 MMHG

## 2023-07-14 DIAGNOSIS — I35.1 NONRHEUMATIC AORTIC (VALVE) INSUFFICIENCY: ICD-10-CM

## 2023-07-14 PROCEDURE — 99215 OFFICE O/P EST HI 40 MIN: CPT

## 2023-07-17 ENCOUNTER — APPOINTMENT (OUTPATIENT)
Dept: CARDIOLOGY | Facility: CLINIC | Age: 48
End: 2023-07-17

## 2023-07-18 ENCOUNTER — RX RENEWAL (OUTPATIENT)
Age: 48
End: 2023-07-18

## 2023-07-20 LAB
25(OH)D3 SERPL-MCNC: 29.9 NG/ML
ALBUMIN SERPL ELPH-MCNC: 4.8 G/DL
ALP BLD-CCNC: 79 U/L
ALT SERPL-CCNC: 49 U/L
ANION GAP SERPL CALC-SCNC: 12 MMOL/L
AST SERPL-CCNC: 28 U/L
BILIRUB SERPL-MCNC: 1.3 MG/DL
BUN SERPL-MCNC: 10 MG/DL
CALCIUM SERPL-MCNC: 9.7 MG/DL
CHLORIDE SERPL-SCNC: 103 MMOL/L
CO2 SERPL-SCNC: 25 MMOL/L
CREAT SERPL-MCNC: 0.55 MG/DL
EGFR: 112 ML/MIN/1.73M2
ESTIMATED AVERAGE GLUCOSE: 120 MG/DL
GLUCOSE SERPL-MCNC: 93 MG/DL
HBA1C MFR BLD HPLC: 5.8 %
POTASSIUM SERPL-SCNC: 4.6 MMOL/L
PROT SERPL-MCNC: 7.3 G/DL
SODIUM SERPL-SCNC: 140 MMOL/L
T4 FREE SERPL-MCNC: 1.7 NG/DL
THYROGLOB AB SERPL-ACNC: <20 IU/ML
THYROGLOB SERPL-MCNC: <0.2 NG/ML
TSH SERPL-ACNC: 0.32 UIU/ML

## 2023-07-21 ENCOUNTER — OUTPATIENT (OUTPATIENT)
Dept: OUTPATIENT SERVICES | Facility: HOSPITAL | Age: 48
LOS: 1 days | End: 2023-07-21

## 2023-07-21 ENCOUNTER — APPOINTMENT (OUTPATIENT)
Dept: ENDOCRINOLOGY | Facility: CLINIC | Age: 48
End: 2023-07-21
Payer: COMMERCIAL

## 2023-07-21 VITALS
HEART RATE: 85 BPM | DIASTOLIC BLOOD PRESSURE: 82 MMHG | RESPIRATION RATE: 18 BRPM | SYSTOLIC BLOOD PRESSURE: 132 MMHG | OXYGEN SATURATION: 99 %

## 2023-07-21 VITALS
HEART RATE: 83 BPM | WEIGHT: 231.93 LBS | RESPIRATION RATE: 16 BRPM | OXYGEN SATURATION: 100 % | DIASTOLIC BLOOD PRESSURE: 88 MMHG | SYSTOLIC BLOOD PRESSURE: 138 MMHG | HEIGHT: 65.5 IN | TEMPERATURE: 98 F

## 2023-07-21 DIAGNOSIS — K21.9 GASTRO-ESOPHAGEAL REFLUX DISEASE WITHOUT ESOPHAGITIS: ICD-10-CM

## 2023-07-21 DIAGNOSIS — N84.0 POLYP OF CORPUS UTERI: ICD-10-CM

## 2023-07-21 DIAGNOSIS — E03.9 HYPOTHYROIDISM, UNSPECIFIED: ICD-10-CM

## 2023-07-21 DIAGNOSIS — E89.0 POSTPROCEDURAL HYPOTHYROIDISM: Chronic | ICD-10-CM

## 2023-07-21 DIAGNOSIS — R07.9 CHEST PAIN, UNSPECIFIED: ICD-10-CM

## 2023-07-21 LAB
ALBUMIN SERPL ELPH-MCNC: 4.8 G/DL — SIGNIFICANT CHANGE UP (ref 3.3–5)
ALP SERPL-CCNC: 80 U/L — SIGNIFICANT CHANGE UP (ref 40–120)
ALT FLD-CCNC: 37 U/L — HIGH (ref 4–33)
ANION GAP SERPL CALC-SCNC: 13 MMOL/L — SIGNIFICANT CHANGE UP (ref 7–14)
AST SERPL-CCNC: 20 U/L — SIGNIFICANT CHANGE UP (ref 4–32)
BILIRUB SERPL-MCNC: 0.5 MG/DL — SIGNIFICANT CHANGE UP (ref 0.2–1.2)
BUN SERPL-MCNC: 16 MG/DL — SIGNIFICANT CHANGE UP (ref 7–23)
CALCIUM SERPL-MCNC: 9.2 MG/DL — SIGNIFICANT CHANGE UP (ref 8.4–10.5)
CHLORIDE SERPL-SCNC: 103 MMOL/L — SIGNIFICANT CHANGE UP (ref 98–107)
CO2 SERPL-SCNC: 23 MMOL/L — SIGNIFICANT CHANGE UP (ref 22–31)
CREAT SERPL-MCNC: 0.54 MG/DL — SIGNIFICANT CHANGE UP (ref 0.5–1.3)
EGFR: 113 ML/MIN/1.73M2 — SIGNIFICANT CHANGE UP
GLUCOSE SERPL-MCNC: 81 MG/DL — SIGNIFICANT CHANGE UP (ref 70–99)
HCG UR QL: NEGATIVE — SIGNIFICANT CHANGE UP
HCT VFR BLD CALC: 37 % — SIGNIFICANT CHANGE UP (ref 34.5–45)
HGB BLD-MCNC: 11 G/DL — LOW (ref 11.5–15.5)
MCHC RBC-ENTMCNC: 19.9 PG — LOW (ref 27–34)
MCHC RBC-ENTMCNC: 29.7 GM/DL — LOW (ref 32–36)
MCV RBC AUTO: 67 FL — LOW (ref 80–100)
NRBC # BLD: 0 /100 WBCS — SIGNIFICANT CHANGE UP (ref 0–0)
NRBC # FLD: 0 K/UL — SIGNIFICANT CHANGE UP (ref 0–0)
PLATELET # BLD AUTO: 330 K/UL — SIGNIFICANT CHANGE UP (ref 150–400)
POTASSIUM SERPL-MCNC: 3.8 MMOL/L — SIGNIFICANT CHANGE UP (ref 3.5–5.3)
POTASSIUM SERPL-SCNC: 3.8 MMOL/L — SIGNIFICANT CHANGE UP (ref 3.5–5.3)
PROT SERPL-MCNC: 7.1 G/DL — SIGNIFICANT CHANGE UP (ref 6–8.3)
RBC # BLD: 5.52 M/UL — HIGH (ref 3.8–5.2)
RBC # FLD: 16.3 % — HIGH (ref 10.3–14.5)
SODIUM SERPL-SCNC: 139 MMOL/L — SIGNIFICANT CHANGE UP (ref 135–145)
WBC # BLD: 9.12 K/UL — SIGNIFICANT CHANGE UP (ref 3.8–10.5)
WBC # FLD AUTO: 9.12 K/UL — SIGNIFICANT CHANGE UP (ref 3.8–10.5)

## 2023-07-21 PROCEDURE — ZZZZZ: CPT

## 2023-07-21 RX ORDER — LEVOTHYROXINE SODIUM 125 MCG
1 TABLET ORAL
Qty: 0 | Refills: 0 | DISCHARGE

## 2023-07-21 RX ORDER — FAMOTIDINE 10 MG/ML
1 INJECTION INTRAVENOUS
Qty: 0 | Refills: 0 | DISCHARGE

## 2023-07-21 NOTE — H&P PST ADULT - NSICDXFAMILYHX_GEN_ALL_CORE_FT
FAMILY HISTORY:  Mother  Still living? Yes, Estimated age: Age Unknown  FH: HTN (hypertension), Age at diagnosis: Age Unknown    Sibling  Still living? Yes, Estimated age: Age Unknown  FH: diabetes mellitus, Age at diagnosis: Age Unknown

## 2023-07-21 NOTE — H&P PST ADULT - HISTORY OF PRESENT ILLNESS
47 yo female with preop dx. polyp of corpus uteri presents to pre surgical testing.  Pt s/p routine GYN exam, during exam pt was c/o some pelvic pain and abnormal vaginal bleeding.  Pt s/p pelvic US revealing endometrial and endocervical polyps.  47 yo female with preop dx. polyp of corpus uteri presents to pre surgical testing.  Pt s/p routine GYN exam, during exam pt was c/o some pelvic pain and abnormal vaginal bleeding.  Pt s/p pelvic US revealing endometrial and endocervical polyps. Pt is scheduled for dilation and curettage hysteroscopy.

## 2023-07-21 NOTE — H&P PST ADULT - PROBLEM SELECTOR PLAN 2
Pt instructed to obtain cardiac eval preop, pt with h/o CP x 3-4 years, last episode was 3-4 weeks ago, pt's last stress test was in 2018, scheduled for another stress test on 8/2/23. Surgeon notified via email.

## 2023-07-21 NOTE — H&P PST ADULT - CARDIOVASCULAR COMMENTS
started about 3-4 years ago, last episode 3-4 weeks ago- not associated with activity, scheduled for stress test 8/2/23

## 2023-07-21 NOTE — H&P PST ADULT - NSICDXPASTMEDICALHX_GEN_ALL_CORE_FT
PAST MEDICAL HISTORY:  Allergic Asthma Last attack was more than 5 years ago    GERD (Gastroesophageal Reflux Disease) 10 years ago    H/O chest pain     Heart Murmur     Hypothyroidism     Malignant Neoplasm of Thyroid Gland     Palpitations     Prediabetes     Seasonal Allergies     Thalassemia Trait     Thyroid Nodule

## 2023-07-21 NOTE — H&P PST ADULT - NSICDXPASTSURGICALHX_GEN_ALL_CORE_FT
PAST SURGICAL HISTORY:  C Section 2009 & 2011    Dermoid Cyst of Ovary Removed in 2009    History of thyroidectomy     Myringotomy with Insertion of Tube Placed in 1985 then removed

## 2023-07-21 NOTE — H&P PST ADULT - MS GEN HX ROS MEA POS PC
back pain Alfa Herrera)  Internal Medicine  [address] [place], [place] [id number]  Phone: [phone]  Fax: [phone]  Follow Up Time:     Dorian Garcia)  Cardiovascular Disease  [address] [facility] [place] [address]  Phone: [phone]  Fax: [phone]  Follow Up Time:

## 2023-07-21 NOTE — H&P PST ADULT - PROBLEM SELECTOR PLAN 4
Pt instructed to take famotidine AM of surgery with a sip of water, pt able to verbalize understanding.

## 2023-07-21 NOTE — H&P PST ADULT - PROBLEM SELECTOR PLAN 1
Pt is scheduled for dilation and curettage hysteroscopy on 8/4/23.  Verbal and written pre op instructions reviewed with patient and pt able to verbalize understanding.   Sterile cup given, pt instructed to bring urine sample AM of surgery for UCG and pt able to verbalize understanding.

## 2023-08-02 ENCOUNTER — APPOINTMENT (OUTPATIENT)
Dept: CARDIOLOGY | Facility: CLINIC | Age: 48
End: 2023-08-02
Payer: COMMERCIAL

## 2023-08-02 PROCEDURE — A9500: CPT

## 2023-08-02 PROCEDURE — 93015 CV STRESS TEST SUPVJ I&R: CPT

## 2023-08-02 PROCEDURE — 78452 HT MUSCLE IMAGE SPECT MULT: CPT

## 2023-08-02 NOTE — HISTORY OF PRESENT ILLNESS
[FreeTextEntry1] : 48-year-old  with a history of palpitations, atypical chest pain, mild AI, papillary muscle thyroid cancer and prediabetes.  July 10, she saw Dr. Vu in routine follow-up.  Blood pressure was 160/84 in follow-up here was advised.  In addition, she is scheduled to undergo surgery to resect Endo atrial and endocervical polyps on August 4.  She has been feeling generally well.  Somewhat less cautious about her sodium intake around July 4 and she was surprised that her pressure was elevated.  On NOOM and has lost approximately 8 pounds.  She reports 2 recent episodes of chest pain 1 with an inframammary pain that occurred nocturnally and lasted several minutes.  Second occurred around the dinner hour, during normal activity when she felt an intense tightness across her upper chest lasting a few minutes.  No recurrence with her usual activity including 15-minute walks.  Climbs 6 steps several times daily without difficulty.  No recurrence of palpitations.  No dizziness or syncope.  Monitors her blood pressure at home with an upper arm standard size cuff checks at random times and is not completely awaiting period with the cuff on her arm.  No c/o throat,jaw, arm or upper back discomfort.  No dyspnea, orthopnea or PND.  No dizziness or syncope.  No edema or claudication.

## 2023-08-04 ENCOUNTER — APPOINTMENT (OUTPATIENT)
Dept: OBGYN | Facility: HOSPITAL | Age: 48
End: 2023-08-04

## 2023-08-10 PROBLEM — Z87.898 PERSONAL HISTORY OF OTHER SPECIFIED CONDITIONS: Chronic | Status: ACTIVE | Noted: 2023-07-21

## 2023-08-10 PROBLEM — E03.9 HYPOTHYROIDISM, UNSPECIFIED: Chronic | Status: ACTIVE | Noted: 2023-07-21

## 2023-08-10 PROBLEM — R73.03 PREDIABETES: Chronic | Status: ACTIVE | Noted: 2023-07-21

## 2023-08-10 PROBLEM — R00.2 PALPITATIONS: Chronic | Status: ACTIVE | Noted: 2023-07-21

## 2023-08-14 ENCOUNTER — OUTPATIENT (OUTPATIENT)
Dept: OUTPATIENT SERVICES | Facility: HOSPITAL | Age: 48
LOS: 1 days | End: 2023-08-14
Payer: COMMERCIAL

## 2023-08-14 ENCOUNTER — TRANSCRIPTION ENCOUNTER (OUTPATIENT)
Age: 48
End: 2023-08-14

## 2023-08-14 VITALS
DIASTOLIC BLOOD PRESSURE: 66 MMHG | OXYGEN SATURATION: 97 % | HEART RATE: 71 BPM | RESPIRATION RATE: 16 BRPM | SYSTOLIC BLOOD PRESSURE: 129 MMHG

## 2023-08-14 VITALS
HEART RATE: 75 BPM | TEMPERATURE: 98 F | SYSTOLIC BLOOD PRESSURE: 149 MMHG | WEIGHT: 229.94 LBS | OXYGEN SATURATION: 98 % | DIASTOLIC BLOOD PRESSURE: 73 MMHG | RESPIRATION RATE: 16 BRPM | HEIGHT: 65 IN

## 2023-08-14 DIAGNOSIS — R94.39 ABNORMAL RESULT OF OTHER CARDIOVASCULAR FUNCTION STUDY: ICD-10-CM

## 2023-08-14 DIAGNOSIS — E89.0 POSTPROCEDURAL HYPOTHYROIDISM: Chronic | ICD-10-CM

## 2023-08-14 LAB
ANION GAP SERPL CALC-SCNC: 15 MMOL/L — SIGNIFICANT CHANGE UP (ref 5–17)
BUN SERPL-MCNC: 12 MG/DL — SIGNIFICANT CHANGE UP (ref 7–23)
CALCIUM SERPL-MCNC: 9.1 MG/DL — SIGNIFICANT CHANGE UP (ref 8.4–10.5)
CHLORIDE SERPL-SCNC: 107 MMOL/L — SIGNIFICANT CHANGE UP (ref 96–108)
CO2 SERPL-SCNC: 19 MMOL/L — LOW (ref 22–31)
CREAT SERPL-MCNC: 0.57 MG/DL — SIGNIFICANT CHANGE UP (ref 0.5–1.3)
EGFR: 111 ML/MIN/1.73M2 — SIGNIFICANT CHANGE UP
GLUCOSE SERPL-MCNC: 96 MG/DL — SIGNIFICANT CHANGE UP (ref 70–99)
HCG SERPL-ACNC: <2 MIU/ML — SIGNIFICANT CHANGE UP
HCT VFR BLD CALC: 36.8 % — SIGNIFICANT CHANGE UP (ref 34.5–45)
HGB BLD-MCNC: 11.2 G/DL — LOW (ref 11.5–15.5)
MCHC RBC-ENTMCNC: 20 PG — LOW (ref 27–34)
MCHC RBC-ENTMCNC: 30.4 GM/DL — LOW (ref 32–36)
MCV RBC AUTO: 65.7 FL — LOW (ref 80–100)
NRBC # BLD: 0 /100 WBCS — SIGNIFICANT CHANGE UP (ref 0–0)
PLATELET # BLD AUTO: 366 K/UL — SIGNIFICANT CHANGE UP (ref 150–400)
POTASSIUM SERPL-MCNC: 3.8 MMOL/L — SIGNIFICANT CHANGE UP (ref 3.5–5.3)
POTASSIUM SERPL-SCNC: 3.8 MMOL/L — SIGNIFICANT CHANGE UP (ref 3.5–5.3)
RBC # BLD: 5.6 M/UL — HIGH (ref 3.8–5.2)
RBC # FLD: 17.2 % — HIGH (ref 10.3–14.5)
SODIUM SERPL-SCNC: 141 MMOL/L — SIGNIFICANT CHANGE UP (ref 135–145)
WBC # BLD: 10.29 K/UL — SIGNIFICANT CHANGE UP (ref 3.8–10.5)
WBC # FLD AUTO: 10.29 K/UL — SIGNIFICANT CHANGE UP (ref 3.8–10.5)

## 2023-08-14 PROCEDURE — C1887: CPT

## 2023-08-14 PROCEDURE — C1894: CPT

## 2023-08-14 PROCEDURE — 85027 COMPLETE CBC AUTOMATED: CPT

## 2023-08-14 PROCEDURE — 84702 CHORIONIC GONADOTROPIN TEST: CPT

## 2023-08-14 PROCEDURE — 99152 MOD SED SAME PHYS/QHP 5/>YRS: CPT

## 2023-08-14 PROCEDURE — 36415 COLL VENOUS BLD VENIPUNCTURE: CPT

## 2023-08-14 PROCEDURE — 93010 ELECTROCARDIOGRAM REPORT: CPT

## 2023-08-14 PROCEDURE — 93005 ELECTROCARDIOGRAM TRACING: CPT

## 2023-08-14 PROCEDURE — 80048 BASIC METABOLIC PNL TOTAL CA: CPT

## 2023-08-14 PROCEDURE — 93458 L HRT ARTERY/VENTRICLE ANGIO: CPT

## 2023-08-14 PROCEDURE — 93458 L HRT ARTERY/VENTRICLE ANGIO: CPT | Mod: 26

## 2023-08-14 PROCEDURE — C1769: CPT

## 2023-08-14 RX ORDER — SODIUM CHLORIDE 9 MG/ML
1000 INJECTION INTRAMUSCULAR; INTRAVENOUS; SUBCUTANEOUS
Refills: 0 | Status: DISCONTINUED | OUTPATIENT
Start: 2023-08-14 | End: 2023-08-28

## 2023-08-14 RX ORDER — IBUPROFEN 200 MG
1 TABLET ORAL
Refills: 0 | DISCHARGE

## 2023-08-14 RX ORDER — SODIUM CHLORIDE 9 MG/ML
250 INJECTION INTRAMUSCULAR; INTRAVENOUS; SUBCUTANEOUS ONCE
Refills: 0 | Status: COMPLETED | OUTPATIENT
Start: 2023-08-14 | End: 2023-08-14

## 2023-08-14 RX ORDER — ACETAMINOPHEN 500 MG
2 TABLET ORAL
Refills: 0 | DISCHARGE

## 2023-08-14 RX ADMIN — SODIUM CHLORIDE 75 MILLILITER(S): 9 INJECTION INTRAMUSCULAR; INTRAVENOUS; SUBCUTANEOUS at 08:57

## 2023-08-14 RX ADMIN — SODIUM CHLORIDE 500 MILLILITER(S): 9 INJECTION INTRAMUSCULAR; INTRAVENOUS; SUBCUTANEOUS at 08:23

## 2023-08-14 NOTE — H&P CARDIOLOGY - RESPIRATORY RATE (BREATHS/MIN)
16 Keystone Flap Text: The defect edges were debeveled with a #15 scalpel blade. Given the location of the defect, shape of the defect a keystone flap was deemed most appropriate. Using a sterile surgical marker, an appropriate keystone flap was drawn incorporating the defect, outlining the appropriate donor tissue and placing the expected incisions within the relaxed skin tension lines where possible. The area thus outlined was incised deep to adipose tissue with a #15 scalpel blade. The skin margins were undermined to an appropriate distance in all directions around the primary defect and laterally outward around the flap utilizing iris scissors. Following this, the designed flap was carried into the primary defect and sutured into place.

## 2023-08-14 NOTE — ASU DISCHARGE PLAN (ADULT/PEDIATRIC) - ASU DC SPECIAL INSTRUCTIONSFT
Wound Care: The day AFTER your procedure:  Remove bandage GENTLY, and clean using mild soap and gentle warm, water stream, pat dry.   Leave OPEN to air. YOU MAY SHOWER  DO NOT SOAK your procedure site for 1 week (no baths, no pools, no tubs)  Check your wrist or groin puncture site everyday.  A small amount of soreness and bruising is normal  ACTIVITY for the next 24 hours:  1) DO NOT DRIVE  2) DO NOT make any important decisions or sign legal documents  3) DO NOT operate heavy Clipary   You may resume sexual activity in 48 hours, unless otherwise instructed by your cardiologist  If your procedure was done through the WRIST, for the NEXT 3 DAYS:  AVOID pushing pulling  or repeated movement of that hand and wrist (eg: typing, hammering)  DO NOT LIFT anything more than 5 lbs     If your procedure was done through the GROIN, for the NEXT 5 DAYS:  Limit climbing the stairs, no strenuous activities, no pushing, no pulling, no straining  Do not lift anything that is 10lbs or heavier   MEDICATION:  Take your medications as explained (see discharge paperwork). If you received a stent, you will be taking medication to KEEP YOUR STENT OPEN. DO NOT STOP THESE MEDICATIONS UNLESS DIRECTED BY A CARDIOLOGIST  If you smoke, WE RECOMMEND YOU QUIT (you may call [phone] the [facility] of Tobacco Control if you need assistance)   FOLLOW UP: Please follow up with your private cardiologist (insert name) in 2 weeks.  Please call immediately for an appointment upon discharge from the hospital.    ***CALL YOUR DOCTOR***   If you experience: chest pain, fever, chills, body aches, or severe pain, swelling, redness, heat or yellow discharge at incision site or if you experience bleeding, temperature change, numbness or excruciating pain at the procedural site  If you are unable to reach your doctor, you may contact:    Cardiology Office at [facility] at [phone]   Cardiac Short Stay Unit (CSSU) [phone]    Cardiac Recovery Suite (CRS) [phone]

## 2023-08-14 NOTE — H&P CARDIOLOGY - RESPIRATORY AND THORAX
Use Lasix more often  Elevate legs when sitting    Call with any questionsor concerns  Follow up with Thom Vivar MD for non cardiac problems  Report any new problems  Cardiovascular Fitness-Exercise as tolerated. Strive for 15 minutes of exercise most days of the week. Cardiac / HealthyDiet  Continue current medications as directed  Continue plan of treatment  It is always recommended that you bring your medicationsbottles with you to each visit - this is for your safety!
negative

## 2023-08-14 NOTE — ASU PATIENT PROFILE, ADULT - FALL HARM RISK - UNIVERSAL INTERVENTIONS
Bed in lowest position, wheels locked, appropriate side rails in place/Call bell, personal items and telephone in reach/Instruct patient to call for assistance before getting out of bed or chair/Non-slip footwear when patient is out of bed/[place] to call system/Physically safe environment - no spills, clutter or unnecessary equipment/Purposeful Proactive Rounding/Room/bathroom lighting operational, light cord in reach

## 2023-08-14 NOTE — ASU DISCHARGE PLAN (ADULT/PEDIATRIC) - NS MD DC FALL RISK RISK
For information on Fall & Injury Prevention, visit: [email or web address] OR  [email or web address] OR  https://www.cdc.gov/steadi/patient.html

## 2023-08-14 NOTE — H&P CARDIOLOGY - HISTORY OF PRESENT ILLNESS
48 year old female with PMHx of palpitations, mild AI, hypothyroid, papillary muscle thyroid cancer, presented to her cardiologist Dr. Woods office with c/o an episode of midsternal chest pressure, which occurred at rest and resolved on its own. She had an abnormal stress test (results below) and now presents for [facility] w/ Dr. Meza. Currently denies chest pain, palpations SOB, dizziness.    Cards: Corey Woods    Stress Test on 8/2/2023  Conclusions:  Myocardial Perfusion: Abnormal.  Qualitative Perfusion:- medium- sized, moderate defect(s) in the anterior, mid anteroseptal and basal anterolateral walls that   are predominantly reversible, partially corrects with prone imaging suggestive of ischemia.  The left ventricle is normal in function and normal in size.  The stress left ventricular EF% is 68 %. The stress end diastolic volume is 82 ml and systolic volume   is 26 ml.

## 2023-08-14 NOTE — ASU DISCHARGE PLAN (ADULT/PEDIATRIC) - CARE PROVIDER_API CALL
Corey Woods  Cardiovascular Disease  [address], [address]  [place] [address]  Phone: [phone]  Fax: [phone]  Follow Up Time: 1 month

## 2023-08-28 ENCOUNTER — APPOINTMENT (OUTPATIENT)
Dept: CARDIOLOGY | Facility: CLINIC | Age: 48
End: 2023-08-28

## 2023-09-07 ENCOUNTER — OUTPATIENT (OUTPATIENT)
Dept: OUTPATIENT SERVICES | Facility: HOSPITAL | Age: 48
LOS: 1 days | End: 2023-09-07

## 2023-09-07 VITALS
HEART RATE: 78 BPM | WEIGHT: 229.94 LBS | SYSTOLIC BLOOD PRESSURE: 125 MMHG | OXYGEN SATURATION: 100 % | TEMPERATURE: 98 F | RESPIRATION RATE: 20 BRPM | HEIGHT: 65.5 IN | DIASTOLIC BLOOD PRESSURE: 85 MMHG

## 2023-09-07 DIAGNOSIS — Z98.891 HISTORY OF UTERINE SCAR FROM PREVIOUS SURGERY: Chronic | ICD-10-CM

## 2023-09-07 DIAGNOSIS — N84.0 POLYP OF CORPUS UTERI: ICD-10-CM

## 2023-09-07 DIAGNOSIS — E89.0 POSTPROCEDURAL HYPOTHYROIDISM: Chronic | ICD-10-CM

## 2023-09-07 LAB
A1C WITH ESTIMATED AVERAGE GLUCOSE RESULT: 5.8 % — HIGH (ref 4–5.6)
ANION GAP SERPL CALC-SCNC: 13 MMOL/L — SIGNIFICANT CHANGE UP (ref 7–14)
BUN SERPL-MCNC: 20 MG/DL — SIGNIFICANT CHANGE UP (ref 7–23)
CALCIUM SERPL-MCNC: 9 MG/DL — SIGNIFICANT CHANGE UP (ref 8.4–10.5)
CHLORIDE SERPL-SCNC: 103 MMOL/L — SIGNIFICANT CHANGE UP (ref 98–107)
CO2 SERPL-SCNC: 22 MMOL/L — SIGNIFICANT CHANGE UP (ref 22–31)
CREAT SERPL-MCNC: 0.63 MG/DL — SIGNIFICANT CHANGE UP (ref 0.5–1.3)
EGFR: 109 ML/MIN/1.73M2 — SIGNIFICANT CHANGE UP
ESTIMATED AVERAGE GLUCOSE: 120 — SIGNIFICANT CHANGE UP
GLUCOSE SERPL-MCNC: 92 MG/DL — SIGNIFICANT CHANGE UP (ref 70–99)
HCG SERPL-ACNC: <1 MIU/ML — SIGNIFICANT CHANGE UP
HCT VFR BLD CALC: 34.2 % — LOW (ref 34.5–45)
HGB BLD-MCNC: 10.6 G/DL — LOW (ref 11.5–15.5)
MCHC RBC-ENTMCNC: 20 PG — LOW (ref 27–34)
MCHC RBC-ENTMCNC: 31 GM/DL — LOW (ref 32–36)
MCV RBC AUTO: 64.4 FL — LOW (ref 80–100)
NRBC # BLD: 0 /100 WBCS — SIGNIFICANT CHANGE UP (ref 0–0)
NRBC # FLD: 0 K/UL — SIGNIFICANT CHANGE UP (ref 0–0)
PLATELET # BLD AUTO: 347 K/UL — SIGNIFICANT CHANGE UP (ref 150–400)
POTASSIUM SERPL-MCNC: 3.7 MMOL/L — SIGNIFICANT CHANGE UP (ref 3.5–5.3)
POTASSIUM SERPL-SCNC: 3.7 MMOL/L — SIGNIFICANT CHANGE UP (ref 3.5–5.3)
RBC # BLD: 5.31 M/UL — HIGH (ref 3.8–5.2)
RBC # FLD: 16.4 % — HIGH (ref 10.3–14.5)
SODIUM SERPL-SCNC: 138 MMOL/L — SIGNIFICANT CHANGE UP (ref 135–145)
WBC # BLD: 9.35 K/UL — SIGNIFICANT CHANGE UP (ref 3.8–10.5)
WBC # FLD AUTO: 9.35 K/UL — SIGNIFICANT CHANGE UP (ref 3.8–10.5)

## 2023-09-07 RX ORDER — SODIUM CHLORIDE 9 MG/ML
1000 INJECTION, SOLUTION INTRAVENOUS
Refills: 0 | Status: DISCONTINUED | OUTPATIENT
Start: 2023-09-15 | End: 2023-09-29

## 2023-09-07 NOTE — H&P PST ADULT - NSICDXPASTSURGICALHX_GEN_ALL_CORE_FT
PAST SURGICAL HISTORY:  Dermoid Cyst of Ovary Removed in     History of 2  sections     History of thyroidectomy     Myringotomy with Insertion of Tube Placed in  then removed

## 2023-09-07 NOTE — H&P PST ADULT - ATTENDING COMMENTS
multipara with abnormal uterine bleeding with findings of endometrial polyps , presents for Hysteroscopic resection.

## 2023-09-07 NOTE — H&P PST ADULT - SKIN
[FreeTextEntry1] : Overall Berto is doing very well.  She has good control of both urine and stool.\par \par Mom has some issues which we addressed:\par \par 1.  [place] regarding prolonged exposure to senna: I advised her that our experience with senna is that is that it is quite safe even for prolonged periods of time and should not be a cause of poor weight gain\par \par 2.  Poor weight gain:  I advised her to see her GI regarding nutrition and that she might benefit from GT supplementation or PO supplementation\par \par 3.  The GT:  Mom concerned that it is too tight; on my exam it seems to fit well and I advised against a larger size.  We exchanged the tube in the office. We also discussed the process of removing it and I explained that when she is not requiring the tube for feeds or meds for a period of time and she is gaining weight we would remove the tube in the office and 80% it heals spontaneously.  I did explain that sometimes a procedure is required to close the gastrocutaneous fistula.\par \par I answered all of mom's questions and would like to see her again in 3 months.  Of course, I would be happy to see BERTO again sooner if there any issues or concerns.\par  warm and dry/color normal/normal/no rashes/no ulcers

## 2023-09-07 NOTE — H&P PST ADULT - ASSESSMENT
Pt. is a 47 yo female with a uterine polyp.     Pt. had a positive stress test.  Angiogram was performed.  Reports are in the chart.

## 2023-09-07 NOTE — H&P PST ADULT - NSICDXPASTMEDICALHX_GEN_ALL_CORE_FT
PAST MEDICAL HISTORY:  2019 novel coronavirus disease (COVID-19)     Allergic Asthma Last attack was more than 5 years ago    GERD (Gastroesophageal Reflux Disease) 10 years ago    H/O chest pain     Heart Murmur     Hypothyroidism     Malignant Neoplasm of Thyroid Gland     Palpitations     Prediabetes     Seasonal Allergies     Thalassemia Trait     Thyroid Nodule      PAST MEDICAL HISTORY:  2019 novel coronavirus disease (COVID-19)     Allergic Asthma Last attack was more than 5 years ago    Elevated blood pressure reading without diagnosis of hypertension     GERD (Gastroesophageal Reflux Disease) 10 years ago    H/O chest pain     Heart Murmur     Hypothyroidism     Malignant Neoplasm of Thyroid Gland     Palpitations     Prediabetes     Seasonal Allergies     Thalassemia Trait     Thyroid Nodule

## 2023-09-07 NOTE — H&P PST ADULT - NSICDXFAMILYHX_GEN_ALL_CORE_FT
FAMILY HISTORY:  Mother  Still living? Yes, Estimated age: Age Unknown  FH: HTN (hypertension), Age at diagnosis: Age Unknown    Sibling  Still living? Yes, Estimated age: Age Unknown  FH: diabetes mellitus, Age at diagnosis: Age Unknown  FH: asthma, Age at diagnosis: Age Unknown    Grandparent  Still living? No  FH: brain tumor, Age at diagnosis: Age Unknown  FH: brain tumor, Age at diagnosis: Age Unknown

## 2023-09-14 ENCOUNTER — TRANSCRIPTION ENCOUNTER (OUTPATIENT)
Age: 48
End: 2023-09-14

## 2023-09-15 ENCOUNTER — TRANSCRIPTION ENCOUNTER (OUTPATIENT)
Age: 48
End: 2023-09-15

## 2023-09-15 ENCOUNTER — RESULT REVIEW (OUTPATIENT)
Age: 48
End: 2023-09-15

## 2023-09-15 ENCOUNTER — OUTPATIENT (OUTPATIENT)
Dept: OUTPATIENT SERVICES | Facility: HOSPITAL | Age: 48
LOS: 1 days | Discharge: HOME | End: 2023-09-15
Payer: COMMERCIAL

## 2023-09-15 ENCOUNTER — APPOINTMENT (OUTPATIENT)
Dept: OBGYN | Facility: HOSPITAL | Age: 48
End: 2023-09-15

## 2023-09-15 VITALS
HEART RATE: 60 BPM | DIASTOLIC BLOOD PRESSURE: 78 MMHG | HEIGHT: 65.5 IN | WEIGHT: 229.94 LBS | SYSTOLIC BLOOD PRESSURE: 144 MMHG | TEMPERATURE: 98 F | OXYGEN SATURATION: 99 % | RESPIRATION RATE: 18 BRPM

## 2023-09-15 VITALS
DIASTOLIC BLOOD PRESSURE: 71 MMHG | RESPIRATION RATE: 15 BRPM | SYSTOLIC BLOOD PRESSURE: 135 MMHG | HEART RATE: 60 BPM | OXYGEN SATURATION: 98 %

## 2023-09-15 DIAGNOSIS — N84.0 POLYP OF CORPUS UTERI: ICD-10-CM

## 2023-09-15 DIAGNOSIS — Z98.891 HISTORY OF UTERINE SCAR FROM PREVIOUS SURGERY: Chronic | ICD-10-CM

## 2023-09-15 DIAGNOSIS — E89.0 POSTPROCEDURAL HYPOTHYROIDISM: Chronic | ICD-10-CM

## 2023-09-15 PROCEDURE — 88305 TISSUE EXAM BY PATHOLOGIST: CPT | Mod: 26

## 2023-09-15 PROCEDURE — 58558 HYSTEROSCOPY BIOPSY: CPT | Mod: GC

## 2023-09-15 RX ORDER — IBUPROFEN 200 MG
1 TABLET ORAL
Qty: 0 | Refills: 0 | DISCHARGE

## 2023-09-15 RX ORDER — FAMOTIDINE 10 MG/ML
1 INJECTION INTRAVENOUS
Refills: 0 | DISCHARGE

## 2023-09-15 RX ORDER — AZELAIC ACID 0.2 G/G
1 CREAM CUTANEOUS
Refills: 0 | DISCHARGE

## 2023-09-15 RX ORDER — LEVOTHYROXINE SODIUM 125 MCG
1 TABLET ORAL
Refills: 0 | DISCHARGE

## 2023-09-15 RX ORDER — FENTANYL CITRATE 50 UG/ML
50 INJECTION INTRAVENOUS
Refills: 0 | Status: DISCONTINUED | OUTPATIENT
Start: 2023-09-15 | End: 2023-09-15

## 2023-09-15 RX ORDER — ACETAMINOPHEN 500 MG
2 TABLET ORAL
Qty: 0 | Refills: 0 | DISCHARGE

## 2023-09-15 RX ORDER — METRONIDAZOLE 7.5 MG/G
1 GEL VAGINAL
Refills: 0 | DISCHARGE

## 2023-09-15 RX ORDER — ONDANSETRON 8 MG/1
4 TABLET, FILM COATED ORAL ONCE
Refills: 0 | Status: DISCONTINUED | OUTPATIENT
Start: 2023-09-15 | End: 2023-09-29

## 2023-09-15 RX ORDER — OXYCODONE HYDROCHLORIDE 5 MG/1
5 TABLET ORAL ONCE
Refills: 0 | Status: DISCONTINUED | OUTPATIENT
Start: 2023-09-15 | End: 2023-09-15

## 2023-09-15 NOTE — ASU DISCHARGE PLAN (ADULT/PEDIATRIC) - NS MD DC FALL RISK RISK
For information on Fall & Injury Prevention, visit: [email or web address] OR  [email or web address] OR  https://www.cdc.gov/steadi/patient.html

## 2023-09-15 NOTE — ASU PREOP CHECKLIST - BSA (M2)
Clinic Care Coordination Contact  [facility]/Voicemail    Referral Source: [facility]  Clinical Data: Care Coordinator Outreach  Outreach attempted x 1.  Left message on voicemail with call back information and requested return call.  Plan: Care Coordinator will try to reach patient again in 1-2 business days.  Forwarded to [facility] covering for writer for f/u attempt.       2.11

## 2023-09-15 NOTE — BRIEF OPERATIVE NOTE - OPERATION/FINDINGS
Exam under anesthesia revealed anteverted uterus 6wk in size, adnexa not palpable bilaterally, cervix closed.  Hysteroscopy revealed uterus without polyps, blood in endometrial cavity. Dilation and curettage performed with endometrial curettings collected for specimen. Tenaculum removed and cervical bleeding noted and repaired with 0-0 chromic suture.

## 2023-09-15 NOTE — ASU DISCHARGE PLAN (ADULT/PEDIATRIC) - FOLLOW UP APPOINTMENTS
413 may also call Recovery Room (PACU) 24/7 @ [phone]/[facility], [facility]

## 2023-09-15 NOTE — ASU DISCHARGE PLAN (ADULT/PEDIATRIC) - OK TO LEAVE MESSAGE ON VOICEMAIL
Patient is a 85-year-old male who came to ED today after his primary care doctor called him and set up blood work in the office yesterday revealed a hemoglobin of 6.  Patient was recently in the ED and discharged yesterday after unremarkable labs (hemoglobin 8.4) and negative CT abdomen pelvis.  Patient originally came to ED 2 days ago for abdominal pain and vomiting which is since resolved.  No fever.    Exam: Cachectic thin fragile male, pale conjunctivae, soft nontender abdomen, no lower extremity edema, no calf tenderness, and no acute distress  Plan: Labs, chest x-ray, CT chest to evaluate nodules, IV fluids and transfusion as indicated
Yes

## 2023-09-15 NOTE — ASU PREOP CHECKLIST - NEEDLE GAUGE
Occupational Therapy    Patient not seen in therapy.     Unavailable due to request no therapy today.      Re-attempt plan: tomorrow or per established plan of care    Attempted OT session. Patient reports she had already asked for a supervisor to get involved to stop therapy. When asked if she meant in general or just today she states, \"are you kidding me? Just today, I wouldn't go against my doctor\". Pt reports she is undergoing a legal lopez and if therapy attempts again there will be legal trouble. Pt receptive to therapy a different time if ordered by her doctor. Explained therapy is always ordered by doctor and our goal is to progress safety/mobility. Pt reports she had a horrible day and it is personal and will not discuss. 1 service code for time spent, will continue OT efforts.       OBJECTIVE                             Therapy procedure time and total treatment time can be found documented on the Time Entry flowsheet  
20
MED/SURG

## 2023-09-15 NOTE — ASU PATIENT PROFILE, ADULT - NSICDXPASTMEDICALHX_GEN_ALL_CORE_FT
PAST MEDICAL HISTORY:  2019 novel coronavirus disease (COVID-19)     Allergic Asthma Last attack was more than 5 years ago    Elevated blood pressure reading without diagnosis of hypertension     GERD (Gastroesophageal Reflux Disease) 10 years ago    H/O chest pain     Heart Murmur     Hypothyroidism     Malignant Neoplasm of Thyroid Gland     Palpitations     Prediabetes     Seasonal Allergies     Thalassemia Trait     Thyroid Nodule

## 2023-09-18 PROBLEM — R03.0 ELEVATED BLOOD-PRESSURE READING, WITHOUT DIAGNOSIS OF HYPERTENSION: Chronic | Status: ACTIVE | Noted: 2023-09-07

## 2023-09-18 PROBLEM — U07.1 COVID-19: Chronic | Status: ACTIVE | Noted: 2023-09-07

## 2023-09-19 LAB — SURGICAL PATHOLOGY STUDY: SIGNIFICANT CHANGE UP

## 2023-09-26 ENCOUNTER — APPOINTMENT (OUTPATIENT)
Dept: OBGYN | Facility: CLINIC | Age: 48
End: 2023-09-26
Payer: COMMERCIAL

## 2023-09-26 VITALS
DIASTOLIC BLOOD PRESSURE: 83 MMHG | HEART RATE: 85 BPM | SYSTOLIC BLOOD PRESSURE: 128 MMHG | BODY MASS INDEX: 38.49 KG/M2 | HEIGHT: 65 IN | WEIGHT: 231 LBS

## 2023-09-26 DIAGNOSIS — Z09 ENCOUNTER FOR FOLLOW-UP EXAMINATION AFTER COMPLETED TREATMENT FOR CONDITIONS OTHER THAN MALIGNANT NEOPLASM: ICD-10-CM

## 2023-09-26 PROCEDURE — 99212 OFFICE O/P EST SF 10 MIN: CPT

## 2023-10-09 ENCOUNTER — APPOINTMENT (OUTPATIENT)
Dept: CARDIOLOGY | Facility: CLINIC | Age: 48
End: 2023-10-09
Payer: COMMERCIAL

## 2023-10-09 VITALS — SYSTOLIC BLOOD PRESSURE: 104 MMHG | DIASTOLIC BLOOD PRESSURE: 70 MMHG

## 2023-10-09 VITALS
DIASTOLIC BLOOD PRESSURE: 82 MMHG | WEIGHT: 234 LBS | SYSTOLIC BLOOD PRESSURE: 140 MMHG | BODY MASS INDEX: 38.94 KG/M2 | OXYGEN SATURATION: 98 % | HEART RATE: 61 BPM

## 2023-10-09 DIAGNOSIS — I34.0 NONRHEUMATIC MITRAL (VALVE) INSUFFICIENCY: ICD-10-CM

## 2023-10-09 PROCEDURE — 99214 OFFICE O/P EST MOD 30 MIN: CPT | Mod: 25

## 2023-10-09 PROCEDURE — 93000 ELECTROCARDIOGRAM COMPLETE: CPT

## 2023-10-12 ENCOUNTER — APPOINTMENT (OUTPATIENT)
Dept: CARDIOLOGY | Facility: CLINIC | Age: 48
End: 2023-10-12

## 2024-01-23 DIAGNOSIS — Z12.39 ENCOUNTER FOR OTHER SCREENING FOR MALIGNANT NEOPLASM OF BREAST: ICD-10-CM

## 2024-01-23 DIAGNOSIS — R92.30 DENSE BREASTS, UNSPECIFIED: ICD-10-CM

## 2024-02-01 RX ORDER — FAMOTIDINE 20 MG/1
20 TABLET, FILM COATED ORAL
Qty: 90 | Refills: 2 | Status: ACTIVE | COMMUNITY
Start: 2020-10-08 | End: 1900-01-01

## 2024-02-29 ENCOUNTER — APPOINTMENT (OUTPATIENT)
Dept: SURGERY | Facility: CLINIC | Age: 49
End: 2024-02-29

## 2024-03-14 ENCOUNTER — NON-APPOINTMENT (OUTPATIENT)
Age: 49
End: 2024-03-14

## 2024-03-14 ENCOUNTER — LABORATORY RESULT (OUTPATIENT)
Age: 49
End: 2024-03-14

## 2024-03-14 ENCOUNTER — APPOINTMENT (OUTPATIENT)
Dept: INTERNAL MEDICINE | Facility: CLINIC | Age: 49
End: 2024-03-14
Payer: COMMERCIAL

## 2024-03-14 VITALS — WEIGHT: 240 LBS | HEIGHT: 65 IN | BODY MASS INDEX: 39.99 KG/M2

## 2024-03-14 VITALS — DIASTOLIC BLOOD PRESSURE: 80 MMHG | HEART RATE: 66 BPM | SYSTOLIC BLOOD PRESSURE: 136 MMHG | RESPIRATION RATE: 14 BRPM

## 2024-03-14 DIAGNOSIS — R73.03 PREDIABETES.: ICD-10-CM

## 2024-03-14 DIAGNOSIS — Z84.1 FAMILY HISTORY OF DISORDERS OF KIDNEY AND URETER: ICD-10-CM

## 2024-03-14 DIAGNOSIS — E03.9 HYPOTHYROIDISM, UNSPECIFIED: ICD-10-CM

## 2024-03-14 DIAGNOSIS — Z00.00 ENCOUNTER FOR GENERAL ADULT MEDICAL EXAMINATION W/OUT ABNORMAL FINDINGS: ICD-10-CM

## 2024-03-14 DIAGNOSIS — E66.9 OBESITY, UNSPECIFIED: ICD-10-CM

## 2024-03-14 DIAGNOSIS — Z82.5 FAMILY HISTORY OF ASTHMA AND OTHER CHRONIC LOWER RESPIRATORY DISEASES: ICD-10-CM

## 2024-03-14 DIAGNOSIS — C73 MALIGNANT NEOPLASM OF THYROID GLAND: ICD-10-CM

## 2024-03-14 DIAGNOSIS — Z82.3 FAMILY HISTORY OF STROKE: ICD-10-CM

## 2024-03-14 DIAGNOSIS — E55.9 VITAMIN D DEFICIENCY, UNSPECIFIED: ICD-10-CM

## 2024-03-14 PROCEDURE — 93000 ELECTROCARDIOGRAM COMPLETE: CPT

## 2024-03-14 PROCEDURE — 99386 PREV VISIT NEW AGE 40-64: CPT

## 2024-03-14 PROCEDURE — 99203 OFFICE O/P NEW LOW 30 MIN: CPT

## 2024-03-14 RX ORDER — NAPROXEN 500 MG/1
500 TABLET ORAL
Qty: 60 | Refills: 0 | Status: DISCONTINUED | COMMUNITY
Start: 2021-09-10 | End: 2024-03-14

## 2024-03-14 NOTE — HEALTH RISK ASSESSMENT
[Very Good] : ~his/her~ current health as very good [No] : No [No falls in past year] : Patient reported no falls in the past year [0] : 2) Feeling down, depressed, or hopeless: Not at all (0) [[id number]] : 0 [Never] : Never

## 2024-03-14 NOTE — HISTORY OF PRESENT ILLNESS
[FreeTextEntry1] : BALJIT GARCIA is a 49 year old female  presents for an annual physical. Patient is also here for f/u of chronic medical conditions, which have been stable on medications. These include hypothryoidism s/p thyroidectomy for papillary thyroid CA, borderline HTN, preDM, obesity   [de-identified] : no acute complaints considering Wegovy  had ?viral illness in February- still not completely recovered - mild nausea/ headaches on occasion.

## 2024-03-14 NOTE — ASSESSMENT
[FreeTextEntry1] : Pt will obtain fasting labs at lab continue current meds weight loss encouraged Pt will consider Wegovy  colonoscopy due - names given  Annual GYN/Mammogram  health counselling provided  Annual CPE

## 2024-03-16 ENCOUNTER — LABORATORY RESULT (OUTPATIENT)
Age: 49
End: 2024-03-16

## 2024-03-30 NOTE — ASU DISCHARGE PLAN (ADULT/PEDIATRIC) - PROVIDER TOKENS
PROVIDER:[TOKEN:[125:MIIS:125],FOLLOWUP:[1 month]]
[Time Spent: ___ minutes] : I have spent [unfilled] minutes of time on the encounter.

## 2024-04-04 LAB
25(OH)D3 SERPL-MCNC: 25.9 NG/ML
ALBUMIN SERPL ELPH-MCNC: 4.5 G/DL
ALP BLD-CCNC: 78 U/L
ALT SERPL-CCNC: 29 U/L
ANION GAP SERPL CALC-SCNC: 13 MMOL/L
APPEARANCE: CLEAR
AST SERPL-CCNC: 17 U/L
BASOPHILS # BLD AUTO: 0.11 K/UL
BASOPHILS NFR BLD AUTO: 1.3 %
BILIRUB SERPL-MCNC: 0.6 MG/DL
BILIRUBIN URINE: NEGATIVE
BLOOD URINE: ABNORMAL
BUN SERPL-MCNC: 13 MG/DL
CALCIUM SERPL-MCNC: 9.2 MG/DL
CHLORIDE SERPL-SCNC: 107 MMOL/L
CHOLEST SERPL-MCNC: 171 MG/DL
CO2 SERPL-SCNC: 21 MMOL/L
COLOR: YELLOW
CREAT SERPL-MCNC: 0.59 MG/DL
EGFR: 110 ML/MIN/1.73M2
EOSINOPHIL # BLD AUTO: 0.3 K/UL
EOSINOPHIL NFR BLD AUTO: 3.4 %
ESTIMATED AVERAGE GLUCOSE: 120 MG/DL
FERRITIN SERPL-MCNC: 77 NG/ML
FOLATE SERPL-MCNC: >20 NG/ML
GLUCOSE QUALITATIVE U: NEGATIVE MG/DL
GLUCOSE SERPL-MCNC: 103 MG/DL
HBA1C MFR BLD HPLC: 5.8 %
HCT VFR BLD CALC: 34.6 %
HDLC SERPL-MCNC: 36 MG/DL
HGB BLD-MCNC: 10.6 G/DL
IMM GRANULOCYTES NFR BLD AUTO: 0.3 %
IRON SATN MFR SERPL: 16 %
IRON SERPL-MCNC: 51 UG/DL
KETONES URINE: NEGATIVE MG/DL
LDLC SERPL CALC-MCNC: 119 MG/DL
LEUKOCYTE ESTERASE URINE: NEGATIVE
LYMPHOCYTES # BLD AUTO: 3 K/UL
LYMPHOCYTES NFR BLD AUTO: 34.2 %
MAGNESIUM SERPL-MCNC: 2 MG/DL
MAN DIFF?: NORMAL
MCHC RBC-ENTMCNC: 20.1 PG
MCHC RBC-ENTMCNC: 30.6 GM/DL
MCV RBC AUTO: 65.5 FL
MONOCYTES # BLD AUTO: 0.6 K/UL
MONOCYTES NFR BLD AUTO: 6.8 %
NEUTROPHILS # BLD AUTO: 4.73 K/UL
NEUTROPHILS NFR BLD AUTO: 54 %
NITRITE URINE: NEGATIVE
NONHDLC SERPL-MCNC: 135 MG/DL
PH URINE: 5.5
PLATELET # BLD AUTO: 346 K/UL
POTASSIUM SERPL-SCNC: 4.6 MMOL/L
PROT SERPL-MCNC: 6.8 G/DL
PROTEIN URINE: NEGATIVE MG/DL
RBC # BLD: 5.28 M/UL
RBC # FLD: 17.9 %
SODIUM SERPL-SCNC: 141 MMOL/L
SPECIFIC GRAVITY URINE: 1.01
T4 FREE SERPL-MCNC: 1.6 NG/DL
T4 SERPL-MCNC: 8.4 UG/DL
TIBC SERPL-MCNC: 320 UG/DL
TRIGL SERPL-MCNC: 86 MG/DL
TSH SERPL-ACNC: 0.35 UIU/ML
UIBC SERPL-MCNC: 269 UG/DL
URATE SERPL-MCNC: 5.5 MG/DL
UROBILINOGEN URINE: 0.2 MG/DL
VIT B12 SERPL-MCNC: 509 PG/ML
WBC # FLD AUTO: 8.77 K/UL

## 2024-05-20 ENCOUNTER — APPOINTMENT (OUTPATIENT)
Dept: SURGERY | Facility: CLINIC | Age: 49
End: 2024-05-20
Payer: COMMERCIAL

## 2024-05-20 DIAGNOSIS — R10.31 RIGHT LOWER QUADRANT PAIN: ICD-10-CM

## 2024-05-20 DIAGNOSIS — R07.89 OTHER CHEST PAIN: ICD-10-CM

## 2024-05-20 DIAGNOSIS — I10 ESSENTIAL (PRIMARY) HYPERTENSION: ICD-10-CM

## 2024-05-20 DIAGNOSIS — R06.09 OTHER FORMS OF DYSPNEA: ICD-10-CM

## 2024-05-20 DIAGNOSIS — K21.9 GASTRO-ESOPHAGEAL REFLUX DISEASE W/OUT ESOPHAGITIS: ICD-10-CM

## 2024-05-20 PROCEDURE — 99203 OFFICE O/P NEW LOW 30 MIN: CPT

## 2024-05-20 NOTE — ASSESSMENT
[FreeTextEntry1] : Discussion regarding all options and risks To return for excision of the two scalp masses All lab values and imaging studies reviewed Discussed with Medicine

## 2024-05-20 NOTE — PHYSICAL EXAM
[Normal Breath Sounds] : Normal breath sounds [Normal Heart Sounds] : normal heart sounds [Normal Rate and Rhythm] : normal rate and rhythm [Abdominal Masses] : No abdominal masses [Abdomen Tenderness] : ~T ~M No abdominal tenderness [No Rash or Lesion] : No rash or lesion [de-identified] : nl [de-identified] : two scalp masses less than 2.0 cm [de-identified] : nl

## 2024-05-20 NOTE — HISTORY OF PRESENT ILLNESS
[de-identified] : This 49 year old woman has a history of undergoing excision of benign scalp masses. Presently, she has developed two new masses which have recently become uncomfortable. There has not been any drainage from the masses and the patient denies fever or chills.

## 2024-06-19 ENCOUNTER — LABORATORY RESULT (OUTPATIENT)
Age: 49
End: 2024-06-19

## 2024-06-19 ENCOUNTER — APPOINTMENT (OUTPATIENT)
Dept: SURGERY | Facility: CLINIC | Age: 49
End: 2024-06-19
Payer: COMMERCIAL

## 2024-06-19 VITALS
WEIGHT: 235 LBS | RESPIRATION RATE: 16 BRPM | OXYGEN SATURATION: 98 % | HEART RATE: 73 BPM | SYSTOLIC BLOOD PRESSURE: 122 MMHG | DIASTOLIC BLOOD PRESSURE: 78 MMHG | HEIGHT: 66 IN | BODY MASS INDEX: 37.77 KG/M2 | TEMPERATURE: 97.9 F

## 2024-06-19 PROCEDURE — 21012 EXC FACE LES SBQ 2 CM/>: CPT

## 2024-06-19 PROCEDURE — 21011 EXC FACE LES SC <2 CM: CPT | Mod: 59

## 2024-06-19 NOTE — PROCEDURE
[FreeTextEntry1] : 1% xylocaine  1. Wide excision 2.1 cm. mass of scalp - right  postero/lateral scalp     Mass extended deep below fascia     Contents of mass expressed before removal - cyst was friable     3, $-0 nylon sutures 2. Wide excision 1.6 cm mass of scalp - left lateral scalp - mass extended deep to fascia     2, #4-0 nylon sutures - contents of mass removed before mass removal

## 2024-06-28 ENCOUNTER — APPOINTMENT (OUTPATIENT)
Dept: SURGERY | Facility: CLINIC | Age: 49
End: 2024-06-28
Payer: COMMERCIAL

## 2024-06-28 ENCOUNTER — RX RENEWAL (OUTPATIENT)
Age: 49
End: 2024-06-28

## 2024-06-28 ENCOUNTER — APPOINTMENT (OUTPATIENT)
Dept: GASTROENTEROLOGY | Facility: CLINIC | Age: 49
End: 2024-06-28

## 2024-06-28 VITALS
HEART RATE: 65 BPM | RESPIRATION RATE: 16 BRPM | HEIGHT: 66 IN | TEMPERATURE: 97.9 F | OXYGEN SATURATION: 99 % | BODY MASS INDEX: 37.77 KG/M2 | WEIGHT: 235 LBS

## 2024-06-28 DIAGNOSIS — R22.0 LOCALIZED SWELLING, MASS AND LUMP, HEAD: ICD-10-CM

## 2024-06-28 PROCEDURE — 99024 POSTOP FOLLOW-UP VISIT: CPT

## 2024-07-06 ENCOUNTER — RESULT REVIEW (OUTPATIENT)
Age: 49
End: 2024-07-06

## 2024-07-06 ENCOUNTER — APPOINTMENT (OUTPATIENT)
Dept: MAMMOGRAPHY | Facility: CLINIC | Age: 49
End: 2024-07-06
Payer: COMMERCIAL

## 2024-07-06 ENCOUNTER — OUTPATIENT (OUTPATIENT)
Dept: OUTPATIENT SERVICES | Facility: HOSPITAL | Age: 49
LOS: 1 days | End: 2024-07-06
Payer: COMMERCIAL

## 2024-07-06 ENCOUNTER — APPOINTMENT (OUTPATIENT)
Dept: ULTRASOUND IMAGING | Facility: CLINIC | Age: 49
End: 2024-07-06
Payer: COMMERCIAL

## 2024-07-06 DIAGNOSIS — E89.0 POSTPROCEDURAL HYPOTHYROIDISM: Chronic | ICD-10-CM

## 2024-07-06 DIAGNOSIS — Z12.39 ENCOUNTER FOR OTHER SCREENING FOR MALIGNANT NEOPLASM OF BREAST: ICD-10-CM

## 2024-07-06 DIAGNOSIS — Z98.891 HISTORY OF UTERINE SCAR FROM PREVIOUS SURGERY: Chronic | ICD-10-CM

## 2024-07-06 DIAGNOSIS — R92.30 DENSE BREASTS, UNSPECIFIED: ICD-10-CM

## 2024-07-06 PROCEDURE — 77067 SCR MAMMO BI INCL CAD: CPT | Mod: 26

## 2024-07-06 PROCEDURE — 77067 SCR MAMMO BI INCL CAD: CPT

## 2024-07-06 PROCEDURE — 76641 ULTRASOUND BREAST COMPLETE: CPT

## 2024-07-06 PROCEDURE — 76641 ULTRASOUND BREAST COMPLETE: CPT | Mod: 26,50

## 2024-07-06 PROCEDURE — 77063 BREAST TOMOSYNTHESIS BI: CPT | Mod: 26

## 2024-07-06 PROCEDURE — 77063 BREAST TOMOSYNTHESIS BI: CPT

## 2024-07-15 ENCOUNTER — APPOINTMENT (OUTPATIENT)
Dept: ENDOCRINOLOGY | Facility: CLINIC | Age: 49
End: 2024-07-15
Payer: COMMERCIAL

## 2024-07-15 VITALS
SYSTOLIC BLOOD PRESSURE: 124 MMHG | WEIGHT: 235 LBS | BODY MASS INDEX: 37.77 KG/M2 | DIASTOLIC BLOOD PRESSURE: 70 MMHG | HEIGHT: 66 IN | HEART RATE: 83 BPM | OXYGEN SATURATION: 99 %

## 2024-07-15 DIAGNOSIS — R73.03 PREDIABETES.: ICD-10-CM

## 2024-07-15 DIAGNOSIS — E66.9 OBESITY, UNSPECIFIED: ICD-10-CM

## 2024-07-15 PROCEDURE — 99214 OFFICE O/P EST MOD 30 MIN: CPT

## 2024-07-15 PROCEDURE — G2211 COMPLEX E/M VISIT ADD ON: CPT

## 2024-07-17 LAB
T4 FREE SERPL-MCNC: 1.6 NG/DL
THYROGLOB AB SERPL-ACNC: <20 IU/ML
THYROGLOB SERPL-MCNC: <0.2 NG/ML
TSH SERPL-ACNC: 0.34 UIU/ML

## 2024-07-22 ENCOUNTER — APPOINTMENT (OUTPATIENT)
Dept: INTERNAL MEDICINE | Facility: CLINIC | Age: 49
End: 2024-07-22
Payer: COMMERCIAL

## 2024-07-22 VITALS — HEART RATE: 78 BPM | RESPIRATION RATE: 14 BRPM | SYSTOLIC BLOOD PRESSURE: 138 MMHG | DIASTOLIC BLOOD PRESSURE: 80 MMHG

## 2024-07-22 DIAGNOSIS — E55.9 VITAMIN D DEFICIENCY, UNSPECIFIED: ICD-10-CM

## 2024-07-22 DIAGNOSIS — E03.9 HYPOTHYROIDISM, UNSPECIFIED: ICD-10-CM

## 2024-07-22 DIAGNOSIS — C73 MALIGNANT NEOPLASM OF THYROID GLAND: ICD-10-CM

## 2024-07-22 DIAGNOSIS — I10 ESSENTIAL (PRIMARY) HYPERTENSION: ICD-10-CM

## 2024-07-22 PROCEDURE — G2211 COMPLEX E/M VISIT ADD ON: CPT

## 2024-07-22 PROCEDURE — 99214 OFFICE O/P EST MOD 30 MIN: CPT

## 2024-07-22 RX ORDER — TIRZEPATIDE 2.5 MG/.5ML
2.5 INJECTION, SOLUTION SUBCUTANEOUS
Qty: 1 | Refills: 0 | Status: ACTIVE | COMMUNITY
Start: 2024-07-22 | End: 1900-01-01

## 2024-07-22 RX ORDER — SEMAGLUTIDE 0.25 MG/.5ML
0.25 INJECTION, SOLUTION SUBCUTANEOUS
Qty: 1 | Refills: 1 | Status: ACTIVE | COMMUNITY
Start: 2024-07-22 | End: 1900-01-01

## 2024-07-22 RX ORDER — ALPRAZOLAM 0.25 MG/1
0.25 TABLET ORAL
Qty: 15 | Refills: 0 | Status: ACTIVE | COMMUNITY
Start: 2024-07-22 | End: 1900-01-01

## 2024-07-22 NOTE — ASSESSMENT
[FreeTextEntry1] : Begin wegovy  OR zepbound depending on insurance coverage  xanax 0.25 mg PRN  istop checked  fu 3-4 weeks after starting injectable low caffeine / low salt diet

## 2024-07-22 NOTE — HISTORY OF PRESENT ILLNESS
[de-identified] : no acute complaints considering Wegovy  going to [place] having anxiety in cars / planes asking for help with anxiety

## 2024-07-23 ENCOUNTER — NON-APPOINTMENT (OUTPATIENT)
Age: 49
End: 2024-07-23

## 2024-07-24 ENCOUNTER — NON-APPOINTMENT (OUTPATIENT)
Age: 49
End: 2024-07-24

## 2024-07-24 ENCOUNTER — APPOINTMENT (OUTPATIENT)
Dept: OPHTHALMOLOGY | Facility: CLINIC | Age: 49
End: 2024-07-24
Payer: COMMERCIAL

## 2024-07-24 PROCEDURE — 92004 COMPRE OPH EXAM NEW PT 1/>: CPT

## 2024-08-26 ENCOUNTER — APPOINTMENT (OUTPATIENT)
Dept: FAMILY MEDICINE | Facility: CLINIC | Age: 49
End: 2024-08-26
Payer: COMMERCIAL

## 2024-08-26 ENCOUNTER — NON-APPOINTMENT (OUTPATIENT)
Age: 49
End: 2024-08-26

## 2024-08-26 VITALS
HEIGHT: 65.5 IN | HEART RATE: 83 BPM | TEMPERATURE: 97.7 F | OXYGEN SATURATION: 98 % | WEIGHT: 244 LBS | SYSTOLIC BLOOD PRESSURE: 122 MMHG | RESPIRATION RATE: 16 BRPM | BODY MASS INDEX: 40.17 KG/M2 | DIASTOLIC BLOOD PRESSURE: 75 MMHG

## 2024-08-26 DIAGNOSIS — Z87.898 PERSONAL HISTORY OF OTHER SPECIFIED CONDITIONS: ICD-10-CM

## 2024-08-26 DIAGNOSIS — C73 MALIGNANT NEOPLASM OF THYROID GLAND: ICD-10-CM

## 2024-08-26 DIAGNOSIS — R07.89 OTHER CHEST PAIN: ICD-10-CM

## 2024-08-26 DIAGNOSIS — D56.3 THALASSEMIA MINOR: ICD-10-CM

## 2024-08-26 DIAGNOSIS — E66.9 OBESITY, UNSPECIFIED: ICD-10-CM

## 2024-08-26 DIAGNOSIS — R06.09 OTHER FORMS OF DYSPNEA: ICD-10-CM

## 2024-08-26 PROCEDURE — G2211 COMPLEX E/M VISIT ADD ON: CPT

## 2024-08-26 PROCEDURE — 99204 OFFICE O/P NEW MOD 45 MIN: CPT
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
## 2024-08-26 NOTE — PLAN
[FreeTextEntry1] : CPT Code Time/Complexity (Beta): - code : 22393 - reason : The provider spent 25 minutes in a face-to-face encounter discussing various patient issues including weight gain, medication concerns, and reviewing patient's health history.
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
## 2024-08-26 NOTE — ASSESSMENT
[FreeTextEntry1] : Summary : The current plan is for the patient to continue taking her Synthroid 150mcg, ZepBound for weight loss, and to manage her burn with the ointment and non-stick patches. A follow-up appointment has been scheduled for a few weeks from now, where we will discuss her experience with ZepBound and determine next steps for her treatment plan.  I am providing continuous care for this patient that includes testing, discussion of options for treatment, and shared decision making with regard to the chosen treatment.

## 2024-09-09 ENCOUNTER — APPOINTMENT (OUTPATIENT)
Dept: GASTROENTEROLOGY | Facility: CLINIC | Age: 49
End: 2024-09-09

## 2024-09-10 ENCOUNTER — APPOINTMENT (OUTPATIENT)
Dept: FAMILY MEDICINE | Facility: CLINIC | Age: 49
End: 2024-09-10
Payer: COMMERCIAL

## 2024-09-10 DIAGNOSIS — E66.9 OBESITY, UNSPECIFIED: ICD-10-CM

## 2024-09-10 PROCEDURE — 99213 OFFICE O/P EST LOW 20 MIN: CPT

## 2024-09-10 PROCEDURE — G2211 COMPLEX E/M VISIT ADD ON: CPT | Mod: NC

## 2024-09-11 NOTE — HISTORY OF PRESENT ILLNESS
[Home] : at home, [unfilled] , at the time of the visit. [Medical Office: ([facility])___] : at the medical office located in  [Verbal consent obtained from patient] : the patient, [unfilled] [FreeTextEntry1] : Follow up weight loss medication [de-identified] : 50yo female with history of papillary thyroid cancer, thalassemia, obesity, prediabetes who presents for follow up after starting zepbound. Reallydislikes stabbing herself with a needle but able to do it. Does feel nausea, full, bloated, but nothing that is debilitating. Just uncomfortable. Helps her not think about eating and not overeating. Has lost about 6-7lbs so far. Has been on it for three weeks so far. Does note constipation, has been taking a stool softener. Also, some heartburn, has taken famotidine.

## 2024-09-11 NOTE — HISTORY OF PRESENT ILLNESS
[Home] : at home, [unfilled] , at the time of the visit. [Medical Office: ([facility])___] : at the medical office located in  [Verbal consent obtained from patient] : the patient, [unfilled] [FreeTextEntry1] : Follow up weight loss medication [de-identified] : 48yo female with history of papillary thyroid cancer, thalassemia, obesity, prediabetes who presents for follow up after starting zepbound. Reallydislikes stabbing herself with a needle but able to do it. Does feel nausea, full, bloated, but nothing that is debilitating. Just uncomfortable. Helps her not think about eating and not overeating. Has lost about 6-7lbs so far. Has been on it for three weeks so far. Does note constipation, has been taking a stool softener. Also, some heartburn, has taken famotidine.

## 2024-09-11 NOTE — PHYSICAL EXAM
[No Respiratory Distress] : no respiratory distress  [Coordination Grossly Intact] : coordination grossly intact [No Focal Deficits] : no focal deficits [Normal] : affect was normal and insight and judgment were intact Alert and oriented to person, place and time

## 2024-10-04 ENCOUNTER — APPOINTMENT (OUTPATIENT)
Dept: FAMILY MEDICINE | Facility: CLINIC | Age: 49
End: 2024-10-04
Payer: COMMERCIAL

## 2024-10-04 DIAGNOSIS — E66.9 OBESITY, UNSPECIFIED: ICD-10-CM

## 2024-10-04 PROCEDURE — G2211 COMPLEX E/M VISIT ADD ON: CPT | Mod: NC

## 2024-10-04 PROCEDURE — 99213 OFFICE O/P EST LOW 20 MIN: CPT

## 2024-10-05 NOTE — HISTORY OF PRESENT ILLNESS
[Home] : at home, [unfilled] , at the time of the visit. [Medical Office: ([facility])___] : at the medical office located in  [Verbal consent obtained from patient] : the patient, [unfilled] [FreeTextEntry1] : Follow up weight loss medication [de-identified] : 48yo female with history of papillary thyroid cancer, thalassemia, obesity, prediabetes who presents for follow up of weight loss medication. She is on zepbound 2.5mg weekly. She still has anxiety around injecting herself, but most recent injection went better. Has asked her  to help her with it. Sleeping better. Getting up at 5am to exercise.  Has lost 13.5lbs.  Started taking culturelle this week and having comfortable bowel movements.

## 2024-10-05 NOTE — HISTORY OF PRESENT ILLNESS
[Home] : at home, [unfilled] , at the time of the visit. [Medical Office: ([facility])___] : at the medical office located in  [Verbal consent obtained from patient] : the patient, [unfilled] [FreeTextEntry1] : Follow up weight loss medication [de-identified] : 48yo female with history of papillary thyroid cancer, thalassemia, obesity, prediabetes who presents for follow up of weight loss medication. She is on zepbound 2.5mg weekly. She still has anxiety around injecting herself, but most recent injection went better. Has asked her  to help her with it. Sleeping better. Getting up at 5am to exercise.  Has lost 13.5lbs.  Started taking culturelle this week and having comfortable bowel movements.

## 2024-10-10 ENCOUNTER — NON-APPOINTMENT (OUTPATIENT)
Age: 49
End: 2024-10-10

## 2024-10-10 ENCOUNTER — APPOINTMENT (OUTPATIENT)
Dept: CARDIOLOGY | Facility: CLINIC | Age: 49
End: 2024-10-10
Payer: COMMERCIAL

## 2024-10-10 VITALS
WEIGHT: 224 LBS | BODY MASS INDEX: 37.32 KG/M2 | DIASTOLIC BLOOD PRESSURE: 80 MMHG | SYSTOLIC BLOOD PRESSURE: 122 MMHG | HEIGHT: 65 IN | HEART RATE: 82 BPM | OXYGEN SATURATION: 99 %

## 2024-10-10 DIAGNOSIS — I34.0 NONRHEUMATIC MITRAL (VALVE) INSUFFICIENCY: ICD-10-CM

## 2024-10-10 DIAGNOSIS — I10 ESSENTIAL (PRIMARY) HYPERTENSION: ICD-10-CM

## 2024-10-10 PROCEDURE — 99214 OFFICE O/P EST MOD 30 MIN: CPT | Mod: 25

## 2024-10-10 PROCEDURE — 93000 ELECTROCARDIOGRAM COMPLETE: CPT

## 2024-10-24 ENCOUNTER — APPOINTMENT (OUTPATIENT)
Dept: GASTROENTEROLOGY | Facility: CLINIC | Age: 49
End: 2024-10-24
Payer: COMMERCIAL

## 2024-10-24 VITALS
BODY MASS INDEX: 36.71 KG/M2 | HEART RATE: 86 BPM | DIASTOLIC BLOOD PRESSURE: 80 MMHG | HEIGHT: 65.5 IN | WEIGHT: 223 LBS | OXYGEN SATURATION: 98 % | SYSTOLIC BLOOD PRESSURE: 118 MMHG

## 2024-10-24 DIAGNOSIS — K59.09 OTHER CONSTIPATION: ICD-10-CM

## 2024-10-24 DIAGNOSIS — Z12.11 ENCOUNTER FOR SCREENING FOR MALIGNANT NEOPLASM OF COLON: ICD-10-CM

## 2024-10-24 DIAGNOSIS — K21.9 GASTRO-ESOPHAGEAL REFLUX DISEASE W/OUT ESOPHAGITIS: ICD-10-CM

## 2024-10-24 PROCEDURE — 99204 OFFICE O/P NEW MOD 45 MIN: CPT

## 2024-10-24 RX ORDER — SODIUM SULFATE, POTASSIUM SULFATE AND MAGNESIUM SULFATE 1.6; 3.13; 17.5 G/177ML; G/177ML; G/177ML
17.5-3.13-1.6 SOLUTION ORAL
Qty: 1 | Refills: 0 | Status: ACTIVE | COMMUNITY
Start: 2024-10-24

## 2024-11-01 ENCOUNTER — APPOINTMENT (OUTPATIENT)
Dept: FAMILY MEDICINE | Facility: CLINIC | Age: 49
End: 2024-11-01

## 2024-11-15 ENCOUNTER — NON-APPOINTMENT (OUTPATIENT)
Age: 49
End: 2024-11-15

## 2024-11-15 ENCOUNTER — APPOINTMENT (OUTPATIENT)
Dept: FAMILY MEDICINE | Facility: CLINIC | Age: 49
End: 2024-11-15
Payer: COMMERCIAL

## 2024-11-15 VITALS — WEIGHT: 220 LBS | BODY MASS INDEX: 36.05 KG/M2

## 2024-11-15 DIAGNOSIS — E03.9 HYPOTHYROIDISM, UNSPECIFIED: ICD-10-CM

## 2024-11-15 PROCEDURE — 99441: CPT

## 2024-12-04 ENCOUNTER — RX RENEWAL (OUTPATIENT)
Age: 49
End: 2024-12-04

## 2024-12-18 ENCOUNTER — RESULT REVIEW (OUTPATIENT)
Age: 49
End: 2024-12-18

## 2024-12-18 ENCOUNTER — APPOINTMENT (OUTPATIENT)
Dept: GASTROENTEROLOGY | Facility: HOSPITAL | Age: 49
End: 2024-12-18

## 2024-12-18 ENCOUNTER — TRANSCRIPTION ENCOUNTER (OUTPATIENT)
Age: 49
End: 2024-12-18

## 2024-12-18 ENCOUNTER — OUTPATIENT (OUTPATIENT)
Dept: OUTPATIENT SERVICES | Facility: HOSPITAL | Age: 49
LOS: 1 days | Discharge: HOME | End: 2024-12-18
Payer: COMMERCIAL

## 2024-12-18 VITALS
RESPIRATION RATE: 15 BRPM | OXYGEN SATURATION: 100 % | HEIGHT: 65 IN | TEMPERATURE: 98 F | WEIGHT: 218.04 LBS | HEART RATE: 67 BPM | SYSTOLIC BLOOD PRESSURE: 134 MMHG | DIASTOLIC BLOOD PRESSURE: 77 MMHG

## 2024-12-18 VITALS
HEART RATE: 78 BPM | SYSTOLIC BLOOD PRESSURE: 118 MMHG | OXYGEN SATURATION: 100 % | DIASTOLIC BLOOD PRESSURE: 83 MMHG | RESPIRATION RATE: 18 BRPM

## 2024-12-18 DIAGNOSIS — K21.9 GASTRO-ESOPHAGEAL REFLUX DISEASE WITHOUT ESOPHAGITIS: ICD-10-CM

## 2024-12-18 DIAGNOSIS — Z98.891 HISTORY OF UTERINE SCAR FROM PREVIOUS SURGERY: Chronic | ICD-10-CM

## 2024-12-18 DIAGNOSIS — E89.0 POSTPROCEDURAL HYPOTHYROIDISM: Chronic | ICD-10-CM

## 2024-12-18 LAB — HCG UR QL: NEGATIVE — SIGNIFICANT CHANGE UP

## 2024-12-18 PROCEDURE — 88305 TISSUE EXAM BY PATHOLOGIST: CPT | Mod: 26

## 2024-12-18 PROCEDURE — 45385 COLONOSCOPY W/LESION REMOVAL: CPT

## 2024-12-18 PROCEDURE — 43239 EGD BIOPSY SINGLE/MULTIPLE: CPT

## 2024-12-18 RX ORDER — 0.9 % SODIUM CHLORIDE 0.9 %
500 INTRAVENOUS SOLUTION INTRAVENOUS
Refills: 0 | Status: DISCONTINUED | OUTPATIENT
Start: 2024-12-18 | End: 2025-01-01

## 2024-12-18 NOTE — PRE PROCEDURE NOTE - PRE PROCEDURE EVALUATION
Pre-Endoscopy Evaluation    Referring Physician:                                    Indication for Procedure:  EGD/colon    Sedation by Anesthesia [X ]    PAST MEDICAL & SURGICAL HISTORY:  Malignant Neoplasm of Thyroid Gland      Allergic Asthma  Last attack was more than 5 years ago      Heart Murmur      Thyroid Nodule      Thalassemia Trait      GERD (Gastroesophageal Reflux Disease)  10 years ago      Seasonal Allergies      Hypothyroidism      H/O chest pain      Palpitations      Prediabetes      2019 novel coronavirus disease (COVID-19)      Elevated blood pressure reading without diagnosis of hypertension      Myringotomy with Insertion of Tube  Placed in  then removed      Dermoid Cyst of Ovary  Removed in       History of thyroidectomy      History of 2  sections          Latex allergy: [ ] yes [X] no    Smoking: [ ] yes  [X] no    AICD/PPM: [ ] yes   [X] no    Pertinent lab data:                      Physical Examination:  Daily     Daily   Vital Signs Last 24 Hrs  T(C): --  T(F): --  HR: --  BP: --  BP(mean): --  RR: --  SpO2: --        Constitutional: NAD    HEENT: PERRLA, EOMI,       Neck:  No JVD    Respiratory: CTAB/L    Cardiovascular: S1 and S2    Gastrointestinal: BS+, soft, NT/ND    Extremities: No peripheral edema    Neurological: A/O x 3, no focal deficits    Psychiatric: Normal mood, normal affect    : No Crow    Skin: No rashes    Comments:    ASA Class: I [ ]  II [ X]  III [ ]  IV [ ]    The patient is a suitable candidate for the planned procedure unless box checked [ ]  No, explain:

## 2024-12-18 NOTE — ASU DISCHARGE PLAN (ADULT/PEDIATRIC) - NS MD DC FALL RISK RISK
For information on Fall & Injury Prevention, visit: [email or web address] OR  [email or web address] OR  https://www.cdc.gov/steadi/patient.html

## 2024-12-18 NOTE — ASU PREOP CHECKLIST - IV STARTED
Pt lives with daughter.   Daughter reports increased sob and weakness overnight and throughout day with increased edema in right lower extremities.   Pt talking in short sentences.   Pt has equal strength to upper extremities. Daughter present at bedside.  
yes

## 2024-12-18 NOTE — ASU PREOP CHECKLIST - SITE MARKED BY ANESTHESIOLOGIST
Patient returned call and acknowledged results.    n/a F/U with PCP at Rehab  Follow up with Dr. Segura (Neurologist) in 2-4 weeks.

## 2024-12-18 NOTE — ASU DISCHARGE PLAN (ADULT/PEDIATRIC) - FINANCIAL ASSISTANCE
[facility] provides services at a reduced cost to those who are determined to be eligible through [facility]’s financial assistance program. Information regarding [facility]’s financial assistance program can be found by going to [email or web address] or by calling [phone].

## 2024-12-20 LAB — SURGICAL PATHOLOGY STUDY: SIGNIFICANT CHANGE UP

## 2025-02-21 ENCOUNTER — APPOINTMENT (OUTPATIENT)
Dept: FAMILY MEDICINE | Facility: CLINIC | Age: 50
End: 2025-02-21
Payer: COMMERCIAL

## 2025-02-21 VITALS
BODY MASS INDEX: 35.56 KG/M2 | RESPIRATION RATE: 16 BRPM | HEART RATE: 71 BPM | DIASTOLIC BLOOD PRESSURE: 73 MMHG | SYSTOLIC BLOOD PRESSURE: 115 MMHG | WEIGHT: 216 LBS | OXYGEN SATURATION: 100 % | HEIGHT: 65.5 IN | TEMPERATURE: 98.1 F

## 2025-02-21 DIAGNOSIS — E03.9 HYPOTHYROIDISM, UNSPECIFIED: ICD-10-CM

## 2025-02-21 DIAGNOSIS — R73.03 PREDIABETES.: ICD-10-CM

## 2025-02-21 DIAGNOSIS — E66.9 OBESITY, UNSPECIFIED: ICD-10-CM

## 2025-02-21 DIAGNOSIS — E78.00 PURE HYPERCHOLESTEROLEMIA, UNSPECIFIED: ICD-10-CM

## 2025-02-21 DIAGNOSIS — N92.6 IRREGULAR MENSTRUATION, UNSPECIFIED: ICD-10-CM

## 2025-02-21 PROCEDURE — G2211 COMPLEX E/M VISIT ADD ON: CPT | Mod: NC

## 2025-02-21 PROCEDURE — 99213 OFFICE O/P EST LOW 20 MIN: CPT

## 2025-03-25 ENCOUNTER — APPOINTMENT (OUTPATIENT)
Dept: OBGYN | Facility: CLINIC | Age: 50
End: 2025-03-25
Payer: COMMERCIAL

## 2025-03-25 VITALS
HEIGHT: 65.5 IN | DIASTOLIC BLOOD PRESSURE: 83 MMHG | WEIGHT: 218 LBS | SYSTOLIC BLOOD PRESSURE: 137 MMHG | BODY MASS INDEX: 35.88 KG/M2 | HEART RATE: 67 BPM

## 2025-03-25 DIAGNOSIS — N95.1 MENOPAUSAL AND FEMALE CLIMACTERIC STATES: ICD-10-CM

## 2025-03-25 DIAGNOSIS — Z78.9 OTHER SPECIFIED HEALTH STATUS: ICD-10-CM

## 2025-03-25 DIAGNOSIS — E66.9 OBESITY, UNSPECIFIED: ICD-10-CM

## 2025-03-25 DIAGNOSIS — Z01.419 ENCOUNTER FOR GYNECOLOGICAL EXAMINATION (GENERAL) (ROUTINE) W/OUT ABNORMAL FINDINGS: ICD-10-CM

## 2025-03-25 PROCEDURE — 99459 PELVIC EXAMINATION: CPT

## 2025-03-25 PROCEDURE — 99396 PREV VISIT EST AGE 40-64: CPT

## 2025-03-25 PROCEDURE — 99214 OFFICE O/P EST MOD 30 MIN: CPT | Mod: 25

## 2025-03-28 LAB
FSH SERPL-MCNC: 21.2 IU/L
HPV HIGH+LOW RISK DNA PNL CVX: NOT DETECTED
LH SERPL-ACNC: 21.5 IU/L

## 2025-03-31 ENCOUNTER — LABORATORY RESULT (OUTPATIENT)
Age: 50
End: 2025-03-31

## 2025-04-01 LAB — CYTOLOGY CVX/VAG DOC THIN PREP: NORMAL

## 2025-04-03 ENCOUNTER — APPOINTMENT (OUTPATIENT)
Dept: FAMILY MEDICINE | Facility: CLINIC | Age: 50
End: 2025-04-03
Payer: COMMERCIAL

## 2025-04-03 VITALS
OXYGEN SATURATION: 100 % | BODY MASS INDEX: 36.05 KG/M2 | RESPIRATION RATE: 16 BRPM | HEART RATE: 66 BPM | SYSTOLIC BLOOD PRESSURE: 113 MMHG | DIASTOLIC BLOOD PRESSURE: 77 MMHG | WEIGHT: 220 LBS | TEMPERATURE: 98.5 F

## 2025-04-03 DIAGNOSIS — E66.9 OBESITY, UNSPECIFIED: ICD-10-CM

## 2025-04-03 DIAGNOSIS — E03.9 HYPOTHYROIDISM, UNSPECIFIED: ICD-10-CM

## 2025-04-03 DIAGNOSIS — R17 UNSPECIFIED JAUNDICE: ICD-10-CM

## 2025-04-03 DIAGNOSIS — R79.89 OTHER SPECIFIED ABNORMAL FINDINGS OF BLOOD CHEMISTRY: ICD-10-CM

## 2025-04-03 DIAGNOSIS — E22.1 HYPERPROLACTINEMIA: ICD-10-CM

## 2025-04-03 LAB — PROLACTIN SERPL-MCNC: 41.8 NG/ML

## 2025-04-03 PROCEDURE — G2211 COMPLEX E/M VISIT ADD ON: CPT | Mod: NC

## 2025-04-03 PROCEDURE — 99214 OFFICE O/P EST MOD 30 MIN: CPT

## 2025-05-03 ENCOUNTER — OUTPATIENT (OUTPATIENT)
Dept: OUTPATIENT SERVICES | Facility: HOSPITAL | Age: 50
LOS: 1 days | End: 2025-05-03
Payer: COMMERCIAL

## 2025-05-03 ENCOUNTER — APPOINTMENT (OUTPATIENT)
Dept: MRI IMAGING | Facility: HOSPITAL | Age: 50
End: 2025-05-03
Payer: COMMERCIAL

## 2025-05-03 DIAGNOSIS — E89.0 POSTPROCEDURAL HYPOTHYROIDISM: Chronic | ICD-10-CM

## 2025-05-03 DIAGNOSIS — Z98.891 HISTORY OF UTERINE SCAR FROM PREVIOUS SURGERY: Chronic | ICD-10-CM

## 2025-05-03 DIAGNOSIS — E22.1 HYPERPROLACTINEMIA: ICD-10-CM

## 2025-05-03 PROCEDURE — 70553 MRI BRAIN STEM W/O & W/DYE: CPT

## 2025-05-03 PROCEDURE — A9579: CPT

## 2025-05-03 PROCEDURE — 70553 MRI BRAIN STEM W/O & W/DYE: CPT | Mod: 26

## 2025-05-07 ENCOUNTER — TRANSCRIPTION ENCOUNTER (OUTPATIENT)
Age: 50
End: 2025-05-07

## 2025-05-19 ENCOUNTER — TRANSCRIPTION ENCOUNTER (OUTPATIENT)
Age: 50
End: 2025-05-19

## 2025-07-15 ENCOUNTER — LABORATORY RESULT (OUTPATIENT)
Age: 50
End: 2025-07-15

## 2025-07-17 ENCOUNTER — APPOINTMENT (OUTPATIENT)
Dept: ORTHOPEDIC SURGERY | Facility: CLINIC | Age: 50
End: 2025-07-17
Payer: COMMERCIAL

## 2025-07-17 ENCOUNTER — NON-APPOINTMENT (OUTPATIENT)
Age: 50
End: 2025-07-17

## 2025-07-17 ENCOUNTER — APPOINTMENT (OUTPATIENT)
Facility: CLINIC | Age: 50
End: 2025-07-17
Payer: COMMERCIAL

## 2025-07-17 VITALS
BODY MASS INDEX: 35.06 KG/M2 | HEIGHT: 65.5 IN | HEART RATE: 72 BPM | SYSTOLIC BLOOD PRESSURE: 140 MMHG | WEIGHT: 213 LBS | DIASTOLIC BLOOD PRESSURE: 81 MMHG

## 2025-07-17 VITALS — BODY MASS INDEX: 36.21 KG/M2 | HEIGHT: 65.5 IN | WEIGHT: 220 LBS

## 2025-07-17 PROBLEM — M77.12 LATERAL EPICONDYLITIS OF LEFT ELBOW: Status: ACTIVE | Noted: 2025-07-17

## 2025-07-17 PROCEDURE — G2211 COMPLEX E/M VISIT ADD ON: CPT | Mod: NC

## 2025-07-17 PROCEDURE — 99214 OFFICE O/P EST MOD 30 MIN: CPT

## 2025-07-17 PROCEDURE — 99203 OFFICE O/P NEW LOW 30 MIN: CPT

## 2025-07-29 VITALS — WEIGHT: 213.8 LBS | BODY MASS INDEX: 35.04 KG/M2

## 2025-08-02 ENCOUNTER — APPOINTMENT (OUTPATIENT)
Dept: MAMMOGRAPHY | Facility: CLINIC | Age: 50
End: 2025-08-02
Payer: COMMERCIAL

## 2025-08-02 ENCOUNTER — APPOINTMENT (OUTPATIENT)
Dept: RADIOLOGY | Facility: CLINIC | Age: 50
End: 2025-08-02
Payer: COMMERCIAL

## 2025-08-02 ENCOUNTER — RESULT REVIEW (OUTPATIENT)
Age: 50
End: 2025-08-02

## 2025-08-02 ENCOUNTER — APPOINTMENT (OUTPATIENT)
Dept: ULTRASOUND IMAGING | Facility: CLINIC | Age: 50
End: 2025-08-02
Payer: COMMERCIAL

## 2025-08-02 PROCEDURE — 77063 BREAST TOMOSYNTHESIS BI: CPT

## 2025-08-02 PROCEDURE — 77080 DXA BONE DENSITY AXIAL: CPT

## 2025-08-02 PROCEDURE — 77067 SCR MAMMO BI INCL CAD: CPT

## 2025-08-02 PROCEDURE — 76641 ULTRASOUND BREAST COMPLETE: CPT | Mod: 50

## 2025-08-20 DIAGNOSIS — M25.532 PAIN IN RIGHT WRIST: ICD-10-CM

## 2025-08-20 DIAGNOSIS — M25.531 PAIN IN RIGHT WRIST: ICD-10-CM

## 2025-09-06 ENCOUNTER — LABORATORY RESULT (OUTPATIENT)
Age: 50
End: 2025-09-06

## 2025-09-08 ENCOUNTER — TRANSCRIPTION ENCOUNTER (OUTPATIENT)
Age: 50
End: 2025-09-08

## 2025-09-09 ENCOUNTER — TRANSCRIPTION ENCOUNTER (OUTPATIENT)
Age: 50
End: 2025-09-09

## (undated) DEVICE — DRSG PAD SANITARY OB

## (undated) DEVICE — BIOPSY FORCEP RADIAL JAW 4 STANDARD WITH NEEDLE

## (undated) DEVICE — VISITEC 4X4

## (undated) DEVICE — BASIN SET DOUBLE

## (undated) DEVICE — CATH IV SAFE BC 22G X 1" (BLUE)

## (undated) DEVICE — TUBING SUCTION NONCONDUCTIVE 6MM X 12FT

## (undated) DEVICE — VENODYNE/SCD SLEEVE CALF MEDIUM

## (undated) DEVICE — BASIN EMESIS 10IN GRADUATED MAUVE

## (undated) DEVICE — GOWN LG

## (undated) DEVICE — CONTAINER FORMALIN 10% 20ML

## (undated) DEVICE — SNARE LESIONHUNTER ROTAT NITNL COLD 10MM

## (undated) DEVICE — POSITIONER STRAP ARMBOARD VELCRO TS-30

## (undated) DEVICE — TUBING IRR SET FOR CYSTOSCOPY 77"

## (undated) DEVICE — DRAPE LIGHT HANDLE COVER (GREEN)

## (undated) DEVICE — DRSG CURITY GAUZE SPONGE 4 X 4" 12-PLY NON-STERILE

## (undated) DEVICE — PRESSURE INFUSOR BAG 1000ML

## (undated) DEVICE — DRSG BANDAID 0.75X3"

## (undated) DEVICE — PACK D&C

## (undated) DEVICE — BITE BLOCK ADULT 20 X 27MM (GREEN)

## (undated) DEVICE — DRAPE TOWEL BLUE 17" X 24"

## (undated) DEVICE — DRSG TELFA 3 X 8

## (undated) DEVICE — DENTURE CUP PINK

## (undated) DEVICE — BIOPSY FORCEP COLD DISP

## (undated) DEVICE — DRSG 2X2

## (undated) DEVICE — TUBING MEDI-VAC W MAXIGRIP CONNECTORS 1/4"X6'

## (undated) DEVICE — GLV 7.5 PROTEXIS (CREAM) MICRO

## (undated) DEVICE — ELCTR GROUNDING PAD ADULT COVIDIEN

## (undated) DEVICE — SOL IRR POUR H2O 500ML

## (undated) DEVICE — UNDERPAD LINEN SAVER 17 X 24"

## (undated) DEVICE — PACK IV START WITH CHG

## (undated) DEVICE — LUBRICATING JELLY HR ONE SHOT 3G

## (undated) DEVICE — PREP BETADINE SPONGE STICKS

## (undated) DEVICE — CLAMP BX HOT RAD JAW 3

## (undated) DEVICE — TUBING IV SET GRAVITY 3Y 100" MACRO

## (undated) DEVICE — PROTECTOR HEEL / ELBOW FLUFFY

## (undated) DEVICE — ELCTR ECG CONDUCTIVE ADHESIVE

## (undated) DEVICE — SALIVA EJECTOR (BLUE)

## (undated) DEVICE — DRAPE IRRIGATION POUCH 19X23"

## (undated) DEVICE — POLY TRAP ETRAP

## (undated) DEVICE — LABELS BLANK W PEN

## (undated) DEVICE — CONTAINER FORMALIN 80ML YELLOW

## (undated) DEVICE — SOL IRR POUR NS 0.9% 1000ML